# Patient Record
Sex: FEMALE | Race: WHITE | Employment: UNEMPLOYED | URBAN - METROPOLITAN AREA
[De-identification: names, ages, dates, MRNs, and addresses within clinical notes are randomized per-mention and may not be internally consistent; named-entity substitution may affect disease eponyms.]

---

## 2021-08-27 LAB
ABO, EXTERNAL RESULT: NORMAL
C. TRACHOMATIS, EXTERNAL RESULT: NEGATIVE
HEP B, EXTERNAL RESULT: NEGATIVE
HIV, EXTERNAL RESULT: NEGATIVE
N. GONORRHOEAE, EXTERNAL RESULT: NEGATIVE
RH FACTOR, EXTERNAL RESULT: POSITIVE
RPR, EXTERNAL RESULT: NORMAL
RUBELLA TITER, EXTERNAL RESULT: NORMAL

## 2021-09-11 ENCOUNTER — HOSPITAL ENCOUNTER (EMERGENCY)
Age: 21
Discharge: HOME | End: 2021-09-11
Attending: EMERGENCY MEDICINE
Payer: COMMERCIAL

## 2021-09-11 ENCOUNTER — APPOINTMENT (OUTPATIENT)
Dept: ULTRASOUND IMAGING | Age: 21
End: 2021-09-11
Payer: COMMERCIAL

## 2021-09-11 ENCOUNTER — APPOINTMENT (OUTPATIENT)
Dept: GENERAL RADIOLOGY | Age: 21
End: 2021-09-11
Payer: COMMERCIAL

## 2021-09-11 VITALS
WEIGHT: 157.63 LBS | OXYGEN SATURATION: 98 % | SYSTOLIC BLOOD PRESSURE: 100 MMHG | DIASTOLIC BLOOD PRESSURE: 65 MMHG | RESPIRATION RATE: 16 BRPM | HEART RATE: 77 BPM | TEMPERATURE: 98.6 F | BODY MASS INDEX: 29.76 KG/M2 | HEIGHT: 61 IN

## 2021-09-11 DIAGNOSIS — R07.9 ACUTE CHEST PAIN: Primary | ICD-10-CM

## 2021-09-11 DIAGNOSIS — Z34.91 FIRST TRIMESTER PREGNANCY: ICD-10-CM

## 2021-09-11 LAB
ANION GAP SERPL CALCULATED.3IONS-SCNC: 12 MMOL/L (ref 3–16)
BASOPHILS ABSOLUTE: 0.1 K/UL (ref 0–0.2)
BASOPHILS RELATIVE PERCENT: 0.5 %
BUN BLDV-MCNC: 6 MG/DL (ref 7–20)
CALCIUM SERPL-MCNC: 9.1 MG/DL (ref 8.3–10.6)
CHLORIDE BLD-SCNC: 100 MMOL/L (ref 99–110)
CO2: 21 MMOL/L (ref 21–32)
CREAT SERPL-MCNC: <0.5 MG/DL (ref 0.6–1.1)
D DIMER: 223 NG/ML DDU (ref 0–229)
EOSINOPHILS ABSOLUTE: 0 K/UL (ref 0–0.6)
EOSINOPHILS RELATIVE PERCENT: 0.3 %
GFR AFRICAN AMERICAN: >60
GFR NON-AFRICAN AMERICAN: >60
GLUCOSE BLD-MCNC: 121 MG/DL (ref 70–99)
GONADOTROPIN, CHORIONIC (HCG) QUANT: NORMAL MIU/ML
HCG QUALITATIVE: POSITIVE
HCT VFR BLD CALC: 42 % (ref 36–48)
HEMOGLOBIN: 14 G/DL (ref 12–16)
LYMPHOCYTES ABSOLUTE: 1.7 K/UL (ref 1–5.1)
LYMPHOCYTES RELATIVE PERCENT: 15.5 %
MAGNESIUM: 1.8 MG/DL (ref 1.8–2.4)
MCH RBC QN AUTO: 30.4 PG (ref 26–34)
MCHC RBC AUTO-ENTMCNC: 33.3 G/DL (ref 31–36)
MCV RBC AUTO: 91.1 FL (ref 80–100)
MONOCYTES ABSOLUTE: 0.8 K/UL (ref 0–1.3)
MONOCYTES RELATIVE PERCENT: 7.4 %
NEUTROPHILS ABSOLUTE: 8.5 K/UL (ref 1.7–7.7)
NEUTROPHILS RELATIVE PERCENT: 76.3 %
PDW BLD-RTO: 13.1 % (ref 12.4–15.4)
PLATELET # BLD: 316 K/UL (ref 135–450)
PMV BLD AUTO: 7.4 FL (ref 5–10.5)
POTASSIUM REFLEX MAGNESIUM: 3.5 MMOL/L (ref 3.5–5.1)
RBC # BLD: 4.61 M/UL (ref 4–5.2)
SODIUM BLD-SCNC: 133 MMOL/L (ref 136–145)
TROPONIN: <0.01 NG/ML
WBC # BLD: 11.1 K/UL (ref 4–11)

## 2021-09-11 PROCEDURE — 84484 ASSAY OF TROPONIN QUANT: CPT

## 2021-09-11 PROCEDURE — 76801 OB US < 14 WKS SINGLE FETUS: CPT

## 2021-09-11 PROCEDURE — 84702 CHORIONIC GONADOTROPIN TEST: CPT

## 2021-09-11 PROCEDURE — 83735 ASSAY OF MAGNESIUM: CPT

## 2021-09-11 PROCEDURE — 99284 EMERGENCY DEPT VISIT MOD MDM: CPT

## 2021-09-11 PROCEDURE — 84703 CHORIONIC GONADOTROPIN ASSAY: CPT

## 2021-09-11 PROCEDURE — 71046 X-RAY EXAM CHEST 2 VIEWS: CPT

## 2021-09-11 PROCEDURE — 85025 COMPLETE CBC W/AUTO DIFF WBC: CPT

## 2021-09-11 PROCEDURE — 80048 BASIC METABOLIC PNL TOTAL CA: CPT

## 2021-09-11 PROCEDURE — 93005 ELECTROCARDIOGRAM TRACING: CPT

## 2021-09-11 PROCEDURE — 85379 FIBRIN DEGRADATION QUANT: CPT

## 2021-09-11 ASSESSMENT — ENCOUNTER SYMPTOMS
BACK PAIN: 1
TROUBLE SWALLOWING: 0
NAUSEA: 0
ABDOMINAL PAIN: 1
SORE THROAT: 0
VOMITING: 0
SHORTNESS OF BREATH: 1
ABDOMINAL DISTENTION: 0
COUGH: 0
DIARRHEA: 0

## 2021-09-11 NOTE — ED NOTES
D/C: Order noted for d/c. Pt confirmed d/c paperwork has correct name. Discharge and education instructions reviewed with patient. Teach-back successful. Pt verbalized understanding and signed d/c papers. Pt denied questions at this time. No acute distress noted. Patient instructed to follow-up as noted - return to emergency department if symptoms worsen. Patient verbalized understanding. Discharged per EDMD with discharge instructions. Pt discharged to private vehicle. Patient stable upon departure. Thanked patient for choosing [id number]  for care. Provider aware of patient pain at time of discharge.        Karen Hackett RN  09/11/21 8025

## 2021-09-11 NOTE — ED NOTES
Patient to [address] via stretcher.      Guillermo Villegas RN  09/11/21 6334

## 2021-09-11 NOTE — ED TRIAGE NOTES
Patient admitted to ED via private car with complaints of chest pain and pressure for the last week. Patient states that it makes it hard to breathe and that sometimes it feels worse when she is working. States that she is 7 weeks pregnant and wants to be sure that her baby is okay. Denies fevers, nausea, vomiting, diarrhea. Denies medical history. VS are WNL. Patient is alert and oriented x4.

## 2021-09-11 NOTE — ED PROVIDER NOTES
[address] [facility]      Pt Name: Ted Wilkins  MRN: [id number]  [place] 2000  Date ofevaluation: 9/11/2021  Provider: Ayesha Tim MD    CHIEF COMPLAINT       Chief Complaint   Patient presents with    Chest Pain      c/o worsening chest \"heavy\" and states \"hard to breathe\" x 1 week. also various aches and pain in back in abdomen. + 7 weeks preg.  + nausea. denies fever or cough. to ED for eval         HISTORY OF PRESENT ILLNESS   (Location/Symptom, Timing/Onset,Context/Setting, Quality, Duration, Modifying Factors, Severity)  Note limiting factors. Ted Wilkins is a 22 y.o. female  who  has no past medical history on file. 26-year-old female with no past medical history who believes she is approximately 7 weeks pregnant but yet to confirm IUP presents for bilateral chest pain which she describes as achy and dull radiates to her upper back which has been going on for the last week. Occurs intermittently at rest.  Is worse with taking deep breaths. Fluctuating in nature. Has tried moving around in certain positions but that does not help. Nothing specifically relieves it. Never had symptoms like this before. No history of DVT or PE. Patient with no other systemic symptoms at this time besides mild shortness of breath when taking deep breaths but those symptoms are not present at rest completely resolved. The history is provided by the patient. No  was used.    Chest Pain  Pain location:  L lateral chest and R lateral chest  Pain quality: aching, dull and pressure    Pain radiates to:  Upper back (Epigastrium and lower abdomen)  Pain severity:  Mild  Onset quality:  Gradual  Duration:  1 week  Timing:  Intermittent  Progression:  Resolved  Chronicity:  New  Context: breathing    Relieved by:  Nothing  Worsened by:  Deep breathing  Ineffective treatments:  Certain positions  Associated symptoms: abdominal pain, back pain and shortness of breath    Associated symptoms: no altered mental status, no cough, no diaphoresis, no dizziness, no dysphagia, no fatigue, no fever, no headache, no lower extremity edema, no nausea, no numbness, no palpitations, no vomiting and no weakness    Risk factors: pregnancy        NursingNotes were reviewed. REVIEW OF SYSTEMS    (2-9 systems for level 4, 10 or more for level 5)     Review of Systems   Constitutional: Negative. Negative for diaphoresis, fatigue and fever. HENT: Negative. Negative for sore throat and trouble swallowing. Respiratory: Positive for shortness of breath. Negative for cough. Cardiovascular: Positive for chest pain. Negative for palpitations. Gastrointestinal: Positive for abdominal pain. Negative for abdominal distention, diarrhea, nausea and vomiting. Genitourinary: Negative. Negative for dysuria, vaginal bleeding and vaginal discharge. Musculoskeletal: Positive for back pain. Skin: Negative. Neurological: Negative. Negative for dizziness, weakness, light-headedness, numbness and headaches. Hematological: Negative. Does not bruise/bleed easily. Except as noted above the remainder of the review of systems was reviewed and negative. PAST MEDICAL HISTORY   History reviewed. No pertinent past medical history. SURGICALHISTORY     History reviewed. No pertinent surgical history. CURRENT MEDICATIONS       Previous Medications    No medications on file            Patient has no known allergies. FAMILY HISTORY     History reviewed. No pertinent family history.        SOCIAL HISTORY       Social History     Socioeconomic History    Marital status: Single     Spouse name: None    Number of children: None    Years of education: None    Highest education level: None   Occupational History    None   Tobacco Use    Smoking status: Never Smoker    Smokeless tobacco: Never Used   Substance and Sexual Activity  Alcohol use: None    Drug use: None    Sexual activity: None   Other Topics Concern    None   Social History Narrative    None     Social Determinants of Health     Financial Resource Strain:     Difficulty of Paying Living Expenses:    Food Insecurity:     Worried About Running Out of Food in the Last Year:     [address] in the Last Year:    Transportation Needs:     Lack of Transportation (Medical):  Lack of Transportation (Non-Medical):    Physical Activity:     Days of Exercise per Week:     Minutes of Exercise per Session:    Stress:     Feeling of Stress :    Social Connections:     Frequency of Communication with Friends and Family:     Frequency of Social Gatherings with Friends and Family:     Attends [employer] Services:     Active Member of Clubs or Organizations:     Attends Club or Organization Meetings:     Marital Status:    Intimate Partner Violence:     Fear of Current or Ex-Partner:     Emotionally Abused:     Physically Abused:     Sexually Abused:        SCREENINGS             PHYSICAL EXAM    (up to 7 for level 4, 8 or more for level 5)     ED Triage Vitals [09/11/21 1044]   BP Temp Temp Source Pulse Resp SpO2 Height Weight   127/72 98.6 °F (37 °C) Temporal 82 16 99 % 5' 1\" (1.549 m) 157 lb 10.1 oz (71.5 kg)       Physical Exam  Vitals and nursing note reviewed. Constitutional:       General: She is not in acute distress. Appearance: She is not toxic-appearing or diaphoretic. HENT:      Head: Normocephalic and atraumatic. Mouth/Throat:      Mouth: Mucous membranes are moist.   Eyes:      Extraocular Movements: Extraocular movements intact. Pupils: Pupils are equal, round, and reactive to light. Cardiovascular:      Rate and Rhythm: Normal rate and regular rhythm. Heart sounds: Normal heart sounds. Pulmonary:      Effort: Pulmonary effort is normal. No respiratory distress. Breath sounds: Normal breath sounds.  No wheezing, rhonchi or rales. Chest:      Chest wall: No tenderness. Abdominal:      General: Abdomen is flat. Bowel sounds are normal.      Palpations: Abdomen is soft. Tenderness: There is no abdominal tenderness. There is no guarding or rebound. Musculoskeletal:      Right lower leg: No edema. Left lower leg: No edema. Skin:     General: Skin is warm and dry. Capillary Refill: Capillary refill takes less than 2 seconds. Neurological:      General: No focal deficit present. Mental Status: She is alert and oriented to person, place, and time. Psychiatric:         Mood and Affect: Mood normal.         Behavior: Behavior normal.         RESULTS     EKG: All EKG's are interpreted by the Emergency Department Physician who either signs or Co-signsthis chart in the absence of a cardiologist.    EKG Interpretation    Interpreted by emergency department physician    Rhythm: normal sinus   Rate: normal  Axis: normal  Ectopy: none  Conduction: normal  ST Segments: no acute change  T Waves: no acute change  Q Waves: none    Clinical Impression: no acute changes    Barbara Ewing MD      RADIOLOGY:   Non-plain filmimages such as CT, Ultrasound and MRI are read by the radiologist. Plain radiographic images are visualized and preliminarily interpreted by the emergency physician with the below findings:    No acute changes    Interpretation per the Radiologist below, if available at the time ofthis note:    US OB LESS THAN 14 WEEKS SINGLE OR FIRST GESTATION   Final Result   Intrauterine pregnancy with normal fetal heart motion, corresponding to 7   weeks 0 days. XR CHEST (2 VW)   Final Result   No acute process.                ED BEDSIDE ULTRASOUND:   Performed by ED Physician - none    LABS:  Labs Reviewed   CBC WITH AUTO DIFFERENTIAL - Abnormal; Notable for the following components:       Result Value    WBC 11.1 (*)     Neutrophils Absolute 8.5 (*)     All other components within normal limits Narrative:     Performed at:  [facility]  [address] [place] [employer] [address]   Phone [phone]   BASIC METABOLIC PANEL W/ REFLEX TO MG FOR LOW K - Abnormal; Notable for the following components:    Sodium 133 (*)     Glucose 121 (*)     BUN 6 (*)     CREATININE <0.5 (*)     All other components within normal limits    Narrative:     Performed at:  [facility]  [address] [place] [address]   Phone [phone]   TROPONIN    Narrative:     Performed at:  [facility]  [address] [facility] [employer] [address]   Phone [phone]   HCG, SERUM, QUALITATIVE    Narrative:     Performed at:  [facility]  [address] [facility] [address] [facility] [employer] [address]   Phone [phone]   HCG, QUANTITATIVE, PREGNANCY    Narrative:     Performed at:  [facility]  [address] [facility] [address] [facility] [place] [address]   Phone [phone]   D-DIMER, QUANTITATIVE    Narrative:     Performed at:  [facility]  [address], [facility] [employer] [address]   Phone [phone]   MAGNESIUM    Narrative:     Performed at:  [facility]  [address], [address] [facility] [address] [address]   Phone [phone]   URINE RT REFLEX TO CULTURE       All other labs were within normal range or not returned as of this dictation. EMERGENCY DEPARTMENT COURSE and DIFFERENTIAL DIAGNOSIS/MDM:   Vitals:    Vitals:    09/11/21 1216 09/11/21 1230 09/11/21 1246 09/11/21 1300   BP: 122/78 110/62 104/70 103/61   Pulse:       Resp:       Temp:       TempSrc:       SpO2: 98% 97% 98% 97%   Weight:       Height:           Patient was given thefollowing medications:  Medications - No data to display    ED COURSE & MEDICAL DECISION MAKING    Pertinent Labs & Imaging studies reviewed.  (See chart for details)   -  Patient seen and evaluated in the emergency department. -  Triage and nursing notes reviewed and incorporated. -  Old chart records reviewed and incorporated. -  Differential Diagnosis: Acute Coronary Syndrome, Congestive Heart Failure, Thoracic Dissection, Pericarditis, Pericardial Effusion, Pulmonary Embolism, Pneumonia, Pneumothorax, Ischemic Bowel, Bowel Obstruction, PUD, GERD, ectopic pregnancy, missed ab, other    81-year-old female with no chronic past medical history but does believe she is approximately 7 weeks pregnant based on LMP but not confirmed yet with ultrasound who presents for pleuritic chest pain worse with deep breathing which radiates to her back as well as some abdominal cramping and abdominal pain. Pain is 0 at this time. No signs of fluid overload or congestive heart failure earlier. No objective signs of DVT. Pain not consistent with pericarditis or thoracic dissection. No signs of systemic infection or however will order chest x-ray to exclude pneumonia or other toleration. Lung sounds equal bilaterally no concern for pneumothorax. No peritoneal findings on abdominal exam today. No right upper quadrant tenderness. Stated vital signs are completely stable. Afebrile not tachycardic saturating well on room air. No respiratory distress. Will obtain labs and work-up for cardiac as well as PE. Will reevaluate. We will also obtain pelvic ultrasound to rule out ectopic pregnancy with abdominal pain in early pregnancy without confirmed IUP. We will use pregnancy adjusted D-dimer and patient with otherwise normal vital signs using the YEARS criteria. Here is the initial reference for the study and validation of years criteria. Christian Newman LM et al. Pregnancy-Adapted YEARS Algorithm for diagnosis of Suspected Pulmonary Embolism. [facility] 2019; 380(12): [id number].     -  Work-up included:  See above  -  ED treatment included: See above  -  Results discussed with patient.     REASSESSMENT     ED Course as of Sep 11 1414   Sat Sep 11, 2021   1314 D-dimer negative even prior to using pregnancy adjustment. White blood cell count just barely elevated. Awaiting ultrasound at this time. Still remains asymptomatic with normal vital signs saturating well on room air. Blood pressure stable.    [SC]   1350 No further symptoms at this time. OB ultrasound pending.    [SC]   5304 OB ultrasound negative other symptoms. Vital signs stable. Will discharge with OB follow-up which she has scheduled next week. Strict return precautions given for any new or worsening symptoms. [SC]      ED Course User Index  [SC] Isaac Hargrove MD     I estimate there is LOW risk for PULMONARY EMBOLISM, ACUTE CORONARY SYNDROME, OR THORACIC AORTIC DISSECTION, thus I consider the discharge disposition reasonable. The patient is at low risk for mortality based on demographic, history and clinical factors. Given the best available information and clinical assessment, I estimate the risk of hospitalization to be greater than risk of treatment at home. I have explained to the patient that the risk could rapidly change, given precautions for return and instructions. Explained to patient that the risk for mortality is low based on demographic, history and clinical factors. I discussed with patient the results of evaluation in the ED, diagnosis, care, and prognosis. The plan is to discharge to home. Patient is in agreement with plan and questions have been answered. I also discussed with patient the reasons which may require a return visit and the importance of follow-up care. The patient is well-appearing, nontoxic, and improved at the time of discharge. Patient agrees to call to arrange follow-up care as directed. Patient understands to return immediately for worsening/change in symptoms. CRITICAL CARE TIME   Total Critical Care time was 30 minutes, excluding separately reportable procedures.   There was a high probability of clinically significant/life threatening deterioration in the patient's condition which required my urgent intervention. This includes multiple reevaluations, vital sign monitoring, pulse oximetry monitoring, telemetry monitoring, clinical response to the IV medications, reviewing the nursing notes, consultation time, dictation/documentation time, and interpretation of the labwork. (This time excludes time spent performing procedures). CONSULTS:  None    PROCEDURES:  Unless otherwise noted below, none     Procedures    FINAL IMPRESSION      1. Acute chest pain    2.  First trimester pregnancy          DISPOSITION/PLAN   DISPOSITION        PATIENT REFERREDTO:  Private OB physician at outside hospital    Schedule an appointment as soon as possible for a visit         DISCHARGEMEDICATIONS:  New Prescriptions    No medications on file          (Please note that portions of this note were completed with a voice recognition program.  Efforts were made to edit the dictations but occasionally words are mis-transcribed.)    Ben Cochran MD (electronically signed)  Attending Emergency Physician         Ben Cochran MD  09/11/21 4464

## 2021-09-12 LAB
EKG ATRIAL RATE: 72 BPM
EKG DIAGNOSIS: NORMAL
EKG P AXIS: 39 DEGREES
EKG P-R INTERVAL: 128 MS
EKG Q-T INTERVAL: 354 MS
EKG QRS DURATION: 86 MS
EKG QTC CALCULATION (BAZETT): 387 MS
EKG R AXIS: 61 DEGREES
EKG T AXIS: 39 DEGREES
EKG VENTRICULAR RATE: 72 BPM

## 2021-10-06 ENCOUNTER — HOSPITAL ENCOUNTER (OUTPATIENT)
Dept: ULTRASOUND IMAGING | Age: 21
Discharge: HOME | End: 2021-10-06
Payer: COMMERCIAL

## 2021-10-06 DIAGNOSIS — Z34.81 ENCOUNTER FOR SUPERVISION OF OTHER NORMAL PREGNANCY, FIRST TRIMESTER: ICD-10-CM

## 2021-10-06 PROCEDURE — 76801 OB US < 14 WKS SINGLE FETUS: CPT

## 2022-03-03 ENCOUNTER — HOSPITAL ENCOUNTER (OUTPATIENT)
Age: 22
Discharge: HOME | End: 2022-03-03
Attending: OBSTETRICS & GYNECOLOGY | Admitting: OBSTETRICS & GYNECOLOGY
Payer: COMMERCIAL

## 2022-03-03 VITALS
TEMPERATURE: 96.8 F | SYSTOLIC BLOOD PRESSURE: 120 MMHG | DIASTOLIC BLOOD PRESSURE: 64 MMHG | BODY MASS INDEX: 35.87 KG/M2 | HEART RATE: 110 BPM | WEIGHT: 190 LBS | HEIGHT: 61 IN | RESPIRATION RATE: 18 BRPM

## 2022-03-03 LAB
ABO/RH: NORMAL
ANTIBODY SCREEN: NORMAL
BACTERIA: ABNORMAL /HPF
BILIRUBIN URINE: NEGATIVE
BLOOD, URINE: NEGATIVE
CLARITY: ABNORMAL
COLOR: ABNORMAL
EPITHELIAL CELLS, UA: 20 /HPF (ref 0–5)
GLUCOSE URINE: NEGATIVE MG/DL
HYALINE CASTS: 2 /LPF (ref 0–8)
KETONES, URINE: NEGATIVE MG/DL
LEUKOCYTE ESTERASE, URINE: ABNORMAL
MICROSCOPIC EXAMINATION: YES
NITRITE, URINE: NEGATIVE
PH UA: 7 (ref 5–8)
PROTEIN UA: NEGATIVE MG/DL
RBC UA: 3 /HPF (ref 0–4)
SPECIFIC GRAVITY UA: 1.01 (ref 1–1.03)
URINE REFLEX TO CULTURE: YES
URINE TYPE: ABNORMAL
UROBILINOGEN, URINE: 1 E.U./DL
WBC UA: 23 /HPF (ref 0–5)

## 2022-03-03 PROCEDURE — 99205 OFFICE O/P NEW HI 60 MIN: CPT

## 2022-03-03 PROCEDURE — 2580000003 HC RX 258: Performed by: OBSTETRICS & GYNECOLOGY

## 2022-03-03 PROCEDURE — 81001 URINALYSIS AUTO W/SCOPE: CPT

## 2022-03-03 PROCEDURE — 86900 BLOOD TYPING SEROLOGIC ABO: CPT

## 2022-03-03 PROCEDURE — 86901 BLOOD TYPING SEROLOGIC RH(D): CPT

## 2022-03-03 PROCEDURE — 86850 RBC ANTIBODY SCREEN: CPT

## 2022-03-03 PROCEDURE — 59025 FETAL NON-STRESS TEST: CPT

## 2022-03-03 PROCEDURE — 87086 URINE CULTURE/COLONY COUNT: CPT

## 2022-03-03 RX ORDER — SODIUM CHLORIDE, SODIUM LACTATE, POTASSIUM CHLORIDE, AND CALCIUM CHLORIDE .6; .31; .03; .02 G/100ML; G/100ML; G/100ML; G/100ML
1000 INJECTION, SOLUTION INTRAVENOUS ONCE
Status: COMPLETED | OUTPATIENT
Start: 2022-03-03 | End: 2022-03-03

## 2022-03-03 RX ADMIN — SODIUM CHLORIDE, POTASSIUM CHLORIDE, SODIUM LACTATE AND CALCIUM CHLORIDE 1000 ML: 600; 310; 30; 20 INJECTION, SOLUTION INTRAVENOUS at 10:24

## 2022-03-03 NOTE — FLOWSHEET NOTE
Dr. Dontae Neves at bedside has assessed patient, reviewed FHT, and performed SVE - closed/thick/high. Verbal order received to discharge patient home. FHT stopped at this time for discharge. After completion of the Medical Screening Evaluation, it has been determined that a medical emergency does not exist and the patient is not in active labor, and therefore the patient may be discharged home.

## 2022-03-03 NOTE — PROGRESS NOTES
23 yo G1 @ 32 weeks, presents to triage with  contractions, that have improved after IVF. Has no LOF, VB, baby is moving well. NST-reactive  SVE: closed/thick/high, cephalic. UA-P  P: d/c to home with precautions, follow routine appt in the office in 10 days.    Faustino Bains MD

## 2022-03-03 NOTE — FLOWSHEET NOTE
Discharge instructions given and reviewed. All questions answered. IV d/c'd for discharge. U/a results still pending, Dr. Brianna Kathleen okay with patients discharge, she will call patient later if its abnormal.  Patient refused wheelchair, ambulated out in stable condition.

## 2022-03-03 NOTE — FLOWSHEET NOTE
Patient admitted to triage with complaints of left and right sided abd pain that she rates a 6/10 and mild vaginal pain. She reports no vaginal bleeding or vaginal fluid leaking. She is feeling the baby move. She states that she was cramping last night but that has stopped and now it is more sharp and on both sides of her abd at the same time. She reported that it is constant, but then as I was completing admission questions, she said \"I feel that pain right now\" and at the same time I noted a contraction on the monitor. FHT shows baseline FHR of 130 with mod variability and several accelerations. Contraction monitor shows irritability. Patient is feeling the irritability but I cannot palpate contractions. The Covid vaccine team arrived in the room and requested an order from the [facility] for the vaccine. Call made to Dr. Elena Zamora in [address], I requested an order for vaccine as well as IV placement and LR bolus to hydrate related to uterine irritability. Orders received. Will also send u/a reflex to culture to rule out UTI. Patient aware and agreeable to all.    20g IV placed in Left hand, one successful attempt, brisk blood return. IVF bolus started.

## 2022-03-03 NOTE — FLOWSHEET NOTE
03/03/22 1036   Fetal Heart Rate   Mode External US   Baseline Rate 130 bpm   Baseline Classification Normal   Variability 6-25 BPM   Pattern Accelerations   Patient Feels Fetal Movement Yes   Interventions RN at Bedside   OB Bladder Status Non-distended;Voiding   OB Bladder Intervention Voiding with Relief   Fetal Monitoring Strip   FMS Reviewed? Yes   FMS Reviewed By? NPyajaira, RN   Uterine Activity   UA Mode Palpation; [place]   Contraction Frequency irritability   Contraction Intensity Mild   Resting Tone Palpated Soft     NST result = Reactive per Dr. Hilary Ormond

## 2022-03-05 LAB — URINE CULTURE, ROUTINE: NORMAL

## 2022-04-07 LAB — GBS, EXTERNAL RESULT: POSITIVE

## 2022-04-28 ENCOUNTER — HOSPITAL ENCOUNTER (INPATIENT)
Age: 22
LOS: 2 days | Discharge: HOME | End: 2022-04-30
Attending: OBSTETRICS & GYNECOLOGY | Admitting: OBSTETRICS & GYNECOLOGY
Payer: COMMERCIAL

## 2022-04-28 ENCOUNTER — ANESTHESIA EVENT (OUTPATIENT)
Dept: LABOR AND DELIVERY | Age: 22
End: 2022-04-28
Payer: COMMERCIAL

## 2022-04-28 ENCOUNTER — ANESTHESIA (OUTPATIENT)
Dept: LABOR AND DELIVERY | Age: 22
End: 2022-04-28
Payer: COMMERCIAL

## 2022-04-28 PROBLEM — O47.9 IRREGULAR UTERINE CONTRACTIONS: Status: ACTIVE | Noted: 2022-04-28

## 2022-04-28 LAB
ABO/RH: NORMAL
AMPHETAMINE SCREEN, URINE: NORMAL
ANTIBODY SCREEN: NORMAL
BARBITURATE SCREEN URINE: NORMAL
BASOPHILS ABSOLUTE: 0.1 K/UL (ref 0–0.2)
BASOPHILS RELATIVE PERCENT: 0.6 %
BENZODIAZEPINE SCREEN, URINE: NORMAL
BUPRENORPHINE URINE: NORMAL
CANNABINOID SCREEN URINE: NORMAL
COCAINE METABOLITE SCREEN URINE: NORMAL
EOSINOPHILS ABSOLUTE: 0 K/UL (ref 0–0.6)
EOSINOPHILS RELATIVE PERCENT: 0 %
HCT VFR BLD CALC: 35.2 % (ref 36–48)
HEMOGLOBIN: 11.4 G/DL (ref 12–16)
LYMPHOCYTES ABSOLUTE: 1 K/UL (ref 1–5.1)
LYMPHOCYTES RELATIVE PERCENT: 7.5 %
Lab: NORMAL
MCH RBC QN AUTO: 28.3 PG (ref 26–34)
MCHC RBC AUTO-ENTMCNC: 32.4 G/DL (ref 31–36)
MCV RBC AUTO: 87.3 FL (ref 80–100)
METHADONE SCREEN, URINE: NORMAL
MONOCYTES ABSOLUTE: 0.6 K/UL (ref 0–1.3)
MONOCYTES RELATIVE PERCENT: 4.4 %
NEUTROPHILS ABSOLUTE: 11.8 K/UL (ref 1.7–7.7)
NEUTROPHILS RELATIVE PERCENT: 87.5 %
OPIATE SCREEN URINE: NORMAL
OXYCODONE URINE: NORMAL
PDW BLD-RTO: 13.6 % (ref 12.4–15.4)
PH UA: 6.5
PHENCYCLIDINE SCREEN URINE: NORMAL
PLATELET # BLD: 259 K/UL (ref 135–450)
PMV BLD AUTO: 7.6 FL (ref 5–10.5)
PROPOXYPHENE SCREEN: NORMAL
RBC # BLD: 4.03 M/UL (ref 4–5.2)
TOTAL SYPHILLIS IGG/IGM: NORMAL
WBC # BLD: 13.5 K/UL (ref 4–11)

## 2022-04-28 PROCEDURE — 10907ZC DRAINAGE OF AMNIOTIC FLUID, THERAPEUTIC FROM PRODUCTS OF CONCEPTION, VIA NATURAL OR ARTIFICIAL OPENING: ICD-10-PCS | Performed by: OBSTETRICS & GYNECOLOGY

## 2022-04-28 PROCEDURE — 85025 COMPLETE CBC W/AUTO DIFF WBC: CPT

## 2022-04-28 PROCEDURE — 59025 FETAL NON-STRESS TEST: CPT

## 2022-04-28 PROCEDURE — 0HQ9XZZ REPAIR PERINEUM SKIN, EXTERNAL APPROACH: ICD-10-PCS | Performed by: OBSTETRICS & GYNECOLOGY

## 2022-04-28 PROCEDURE — 6370000000 HC RX 637 (ALT 250 FOR IP): Performed by: OBSTETRICS & GYNECOLOGY

## 2022-04-28 PROCEDURE — 2500000003 HC RX 250 WO HCPCS: Performed by: NURSE ANESTHETIST, CERTIFIED REGISTERED

## 2022-04-28 PROCEDURE — 86901 BLOOD TYPING SEROLOGIC RH(D): CPT

## 2022-04-28 PROCEDURE — 1220000000 HC SEMI PRIVATE OB R&B

## 2022-04-28 PROCEDURE — 2500000003 HC RX 250 WO HCPCS

## 2022-04-28 PROCEDURE — 6360000002 HC RX W HCPCS: Performed by: OBSTETRICS & GYNECOLOGY

## 2022-04-28 PROCEDURE — 86780 TREPONEMA PALLIDUM: CPT

## 2022-04-28 PROCEDURE — 3700000025 EPIDURAL BLOCK: Performed by: ANESTHESIOLOGY

## 2022-04-28 PROCEDURE — 86900 BLOOD TYPING SEROLOGIC ABO: CPT

## 2022-04-28 PROCEDURE — 80307 DRUG TEST PRSMV CHEM ANLYZR: CPT

## 2022-04-28 PROCEDURE — 86850 RBC ANTIBODY SCREEN: CPT

## 2022-04-28 PROCEDURE — 2580000003 HC RX 258: Performed by: OBSTETRICS & GYNECOLOGY

## 2022-04-28 PROCEDURE — 51701 INSERT BLADDER CATHETER: CPT

## 2022-04-28 PROCEDURE — 7200000001 HC VAGINAL DELIVERY

## 2022-04-28 PROCEDURE — 3E033VJ INTRODUCTION OF OTHER HORMONE INTO PERIPHERAL VEIN, PERCUTANEOUS APPROACH: ICD-10-PCS | Performed by: OBSTETRICS & GYNECOLOGY

## 2022-04-28 RX ORDER — LIDOCAINE HYDROCHLORIDE AND EPINEPHRINE 15; 5 MG/ML; UG/ML
INJECTION, SOLUTION EPIDURAL PRN
Status: DISCONTINUED | OUTPATIENT
Start: 2022-04-28 | End: 2022-04-28 | Stop reason: SDUPTHER

## 2022-04-28 RX ORDER — SODIUM CHLORIDE 0.9 % (FLUSH) 0.9 %
5-40 SYRINGE (ML) INJECTION PRN
Status: DISCONTINUED | OUTPATIENT
Start: 2022-04-28 | End: 2022-04-30 | Stop reason: HOSPADM

## 2022-04-28 RX ORDER — SODIUM CHLORIDE, SODIUM LACTATE, POTASSIUM CHLORIDE, AND CALCIUM CHLORIDE .6; .31; .03; .02 G/100ML; G/100ML; G/100ML; G/100ML
500 INJECTION, SOLUTION INTRAVENOUS PRN
Status: DISCONTINUED | OUTPATIENT
Start: 2022-04-28 | End: 2022-04-28

## 2022-04-28 RX ORDER — NALOXONE HYDROCHLORIDE 0.4 MG/ML
INJECTION, SOLUTION INTRAMUSCULAR; INTRAVENOUS; SUBCUTANEOUS PRN
Status: DISCONTINUED | OUTPATIENT
Start: 2022-04-28 | End: 2022-04-28

## 2022-04-28 RX ORDER — SODIUM CHLORIDE 0.9 % (FLUSH) 0.9 %
5-40 SYRINGE (ML) INJECTION PRN
Status: DISCONTINUED | OUTPATIENT
Start: 2022-04-28 | End: 2022-04-28

## 2022-04-28 RX ORDER — DOCUSATE SODIUM 100 MG/1
100 CAPSULE, LIQUID FILLED ORAL 2 TIMES DAILY
Status: DISCONTINUED | OUTPATIENT
Start: 2022-04-28 | End: 2022-04-30 | Stop reason: HOSPADM

## 2022-04-28 RX ORDER — BUPIVACAINE HYDROCHLORIDE 2.5 MG/ML
INJECTION, SOLUTION EPIDURAL; INFILTRATION; INTRACAUDAL PRN
Status: DISCONTINUED | OUTPATIENT
Start: 2022-04-28 | End: 2022-04-28 | Stop reason: SDUPTHER

## 2022-04-28 RX ORDER — SODIUM CHLORIDE 9 MG/ML
INJECTION, SOLUTION INTRAVENOUS PRN
Status: DISCONTINUED | OUTPATIENT
Start: 2022-04-28 | End: 2022-04-30 | Stop reason: HOSPADM

## 2022-04-28 RX ORDER — SODIUM CHLORIDE, SODIUM LACTATE, POTASSIUM CHLORIDE, CALCIUM CHLORIDE 600; 310; 30; 20 MG/100ML; MG/100ML; MG/100ML; MG/100ML
INJECTION, SOLUTION INTRAVENOUS CONTINUOUS
Status: DISCONTINUED | OUTPATIENT
Start: 2022-04-28 | End: 2022-04-28

## 2022-04-28 RX ORDER — HYDROCODONE BITARTRATE AND ACETAMINOPHEN 5; 325 MG/1; MG/1
1 TABLET ORAL EVERY 4 HOURS PRN
Status: DISCONTINUED | OUTPATIENT
Start: 2022-04-28 | End: 2022-04-30 | Stop reason: HOSPADM

## 2022-04-28 RX ORDER — ONDANSETRON 2 MG/ML
4 INJECTION INTRAMUSCULAR; INTRAVENOUS EVERY 6 HOURS PRN
Status: DISCONTINUED | OUTPATIENT
Start: 2022-04-28 | End: 2022-04-28

## 2022-04-28 RX ORDER — SODIUM CHLORIDE 0.9 % (FLUSH) 0.9 %
5-40 SYRINGE (ML) INJECTION EVERY 12 HOURS SCHEDULED
Status: DISCONTINUED | OUTPATIENT
Start: 2022-04-28 | End: 2022-04-30 | Stop reason: HOSPADM

## 2022-04-28 RX ORDER — IBUPROFEN 800 MG/1
800 TABLET ORAL EVERY 8 HOURS PRN
Status: DISCONTINUED | OUTPATIENT
Start: 2022-04-28 | End: 2022-04-30 | Stop reason: HOSPADM

## 2022-04-28 RX ORDER — LANOLIN 100 %
OINTMENT (GRAM) TOPICAL PRN
Status: DISCONTINUED | OUTPATIENT
Start: 2022-04-28 | End: 2022-04-30 | Stop reason: HOSPADM

## 2022-04-28 RX ORDER — HYDROCODONE BITARTRATE AND ACETAMINOPHEN 5; 325 MG/1; MG/1
2 TABLET ORAL EVERY 4 HOURS PRN
Status: DISCONTINUED | OUTPATIENT
Start: 2022-04-28 | End: 2022-04-30 | Stop reason: HOSPADM

## 2022-04-28 RX ORDER — LIDOCAINE HYDROCHLORIDE 20 MG/ML
INJECTION, SOLUTION EPIDURAL; INFILTRATION; INTRACAUDAL; PERINEURAL PRN
Status: DISCONTINUED | OUTPATIENT
Start: 2022-04-28 | End: 2022-04-28 | Stop reason: SDUPTHER

## 2022-04-28 RX ORDER — TERBUTALINE SULFATE 1 MG/ML
0.25 INJECTION, SOLUTION SUBCUTANEOUS ONCE
Status: DISCONTINUED | OUTPATIENT
Start: 2022-04-28 | End: 2022-04-28

## 2022-04-28 RX ORDER — ACETAMINOPHEN 325 MG/1
650 TABLET ORAL EVERY 4 HOURS PRN
Status: DISCONTINUED | OUTPATIENT
Start: 2022-04-28 | End: 2022-04-30 | Stop reason: HOSPADM

## 2022-04-28 RX ORDER — SODIUM CHLORIDE 9 MG/ML
25 INJECTION, SOLUTION INTRAVENOUS PRN
Status: DISCONTINUED | OUTPATIENT
Start: 2022-04-28 | End: 2022-04-28

## 2022-04-28 RX ORDER — SODIUM CHLORIDE, SODIUM LACTATE, POTASSIUM CHLORIDE, AND CALCIUM CHLORIDE .6; .31; .03; .02 G/100ML; G/100ML; G/100ML; G/100ML
1000 INJECTION, SOLUTION INTRAVENOUS PRN
Status: DISCONTINUED | OUTPATIENT
Start: 2022-04-28 | End: 2022-04-28

## 2022-04-28 RX ORDER — SODIUM CHLORIDE 0.9 % (FLUSH) 0.9 %
5-40 SYRINGE (ML) INJECTION EVERY 12 HOURS SCHEDULED
Status: DISCONTINUED | OUTPATIENT
Start: 2022-04-28 | End: 2022-04-28

## 2022-04-28 RX ADMIN — LIDOCAINE HYDROCHLORIDE 5 ML: 20 INJECTION, SOLUTION EPIDURAL; INFILTRATION; INTRACAUDAL; PERINEURAL at 13:11

## 2022-04-28 RX ADMIN — SODIUM CHLORIDE, POTASSIUM CHLORIDE, SODIUM LACTATE AND CALCIUM CHLORIDE 1000 ML: 600; 310; 30; 20 INJECTION, SOLUTION INTRAVENOUS at 07:54

## 2022-04-28 RX ADMIN — DEXTROSE MONOHYDRATE 5 MILLION UNITS: 5 INJECTION INTRAVENOUS at 07:54

## 2022-04-28 RX ADMIN — Medication 2.5 MILLION UNITS: at 12:21

## 2022-04-28 RX ADMIN — IBUPROFEN 800 MG: 800 TABLET, FILM COATED ORAL at 17:07

## 2022-04-28 RX ADMIN — LIDOCAINE HYDROCHLORIDE AND EPINEPHRINE 3 ML: 15; 5 INJECTION, SOLUTION EPIDURAL at 09:17

## 2022-04-28 RX ADMIN — SODIUM CHLORIDE, POTASSIUM CHLORIDE, SODIUM LACTATE AND CALCIUM CHLORIDE 1000 ML: 600; 310; 30; 20 INJECTION, SOLUTION INTRAVENOUS at 08:14

## 2022-04-28 RX ADMIN — Medication 10 UNITS: at 14:04

## 2022-04-28 RX ADMIN — Medication 87.3 MILLI-UNITS/MIN: at 14:17

## 2022-04-28 RX ADMIN — Medication 1 MILLI-UNITS/MIN: at 08:10

## 2022-04-28 RX ADMIN — BUPIVACAINE HYDROCHLORIDE 5 ML: 2.5 INJECTION, SOLUTION EPIDURAL; INFILTRATION; INTRACAUDAL at 12:46

## 2022-04-28 SDOH — HEALTH STABILITY: MENTAL HEALTH
STRESS IS WHEN SOMEONE FEELS TENSE, NERVOUS, ANXIOUS, OR CAN'T SLEEP AT NIGHT BECAUSE THEIR MIND IS TROUBLED. HOW STRESSED ARE YOU?: NOT AT ALL

## 2022-04-28 SDOH — ECONOMIC STABILITY: INCOME INSECURITY: IN THE LAST 12 MONTHS, WAS THERE A TIME WHEN YOU WERE NOT ABLE TO PAY THE MORTGAGE OR RENT ON TIME?: NO

## 2022-04-28 SDOH — ECONOMIC STABILITY: TRANSPORTATION INSECURITY
IN THE PAST 12 MONTHS, HAS THE LACK OF TRANSPORTATION KEPT YOU FROM MEDICAL APPOINTMENTS OR FROM GETTING MEDICATIONS?: NO

## 2022-04-28 SDOH — ECONOMIC STABILITY: HOUSING INSECURITY
IN THE LAST 12 MONTHS, WAS THERE A TIME WHEN YOU DID NOT HAVE A STEADY PLACE TO SLEEP OR SLEPT IN A SHELTER (INCLUDING NOW)?: NO

## 2022-04-28 SDOH — SOCIAL STABILITY: SOCIAL NETWORK: ARE YOU MARRIED, WIDOWED, DIVORCED, SEPARATED, NEVER MARRIED, OR LIVING WITH A PARTNER?: LIVING WITH PARTNER

## 2022-04-28 SDOH — ECONOMIC STABILITY: INCOME INSECURITY: HOW HARD IS IT FOR YOU TO PAY FOR THE VERY BASICS LIKE FOOD, HOUSING, MEDICAL CARE, AND HEATING?: NOT HARD AT ALL

## 2022-04-28 SDOH — ECONOMIC STABILITY: TRANSPORTATION INSECURITY
IN THE PAST 12 MONTHS, HAS LACK OF TRANSPORTATION KEPT YOU FROM MEETINGS, WORK, OR FROM GETTING THINGS NEEDED FOR DAILY LIVING?: NO

## 2022-04-28 SDOH — HEALTH STABILITY: PHYSICAL HEALTH: ON AVERAGE, HOW MANY DAYS PER WEEK DO YOU ENGAGE IN MODERATE TO STRENUOUS EXERCISE (LIKE A BRISK WALK)?: 2 DAYS

## 2022-04-28 SDOH — HEALTH STABILITY: PHYSICAL HEALTH: ON AVERAGE, HOW MANY MINUTES DO YOU ENGAGE IN EXERCISE AT THIS LEVEL?: 50 MIN

## 2022-04-28 SDOH — SOCIAL STABILITY: SOCIAL NETWORK: HOW OFTEN DO YOU ATTENT MEETINGS OF THE CLUB OR ORGANIZATION YOU BELONG TO?: NEVER

## 2022-04-28 SDOH — HEALTH STABILITY: MENTAL HEALTH: HOW OFTEN DO YOU HAVE A DRINK CONTAINING ALCOHOL?: NEVER

## 2022-04-28 SDOH — ECONOMIC STABILITY: FOOD INSECURITY: WITHIN THE PAST 12 MONTHS, THE FOOD YOU BOUGHT JUST DIDN'T LAST AND YOU DIDN'T HAVE MONEY TO GET MORE.: NEVER TRUE

## 2022-04-28 SDOH — SOCIAL STABILITY: SOCIAL NETWORK: IN A TYPICAL WEEK, HOW MANY TIMES DO YOU TALK ON THE PHONE WITH FAMILY, FRIENDS, OR NEIGHBORS?: THREE TIMES A WEEK

## 2022-04-28 SDOH — SOCIAL STABILITY: SOCIAL NETWORK
DO YOU BELONG TO ANY CLUBS OR ORGANIZATIONS SUCH AS CHURCH GROUPS UNIONS, FRATERNAL OR ATHLETIC GROUPS, OR SCHOOL GROUPS?: NO

## 2022-04-28 SDOH — SOCIAL STABILITY: SOCIAL INSECURITY: WITHIN THE LAST YEAR, HAVE YOU BEEN AFRAID OF YOUR PARTNER OR EX-PARTNER?: NO

## 2022-04-28 SDOH — ECONOMIC STABILITY: FOOD INSECURITY: WITHIN THE PAST 12 MONTHS, YOU WORRIED THAT YOUR FOOD WOULD RUN OUT BEFORE YOU GOT MONEY TO BUY MORE.: NEVER TRUE

## 2022-04-28 SDOH — ECONOMIC STABILITY: INCOME INSECURITY: IN THE LAST 12 MONTHS, WAS THERE A TIME WHEN YOU WERE NOT ABLE TO PAY THE MORTGAGE OR RENT ON TIME?: YES

## 2022-04-28 SDOH — SOCIAL STABILITY: SOCIAL NETWORK: HOW OFTEN DO YOU ATTEND CHURCH OR RELIGIOUS SERVICES?: NEVER

## 2022-04-28 SDOH — SOCIAL STABILITY: SOCIAL INSECURITY: WITHIN THE LAST YEAR, HAVE YOU BEEN HUMILIATED OR EMOTIONALLY ABUSED IN OTHER WAYS BY YOUR PARTNER OR EX-PARTNER?: NO

## 2022-04-28 SDOH — SOCIAL STABILITY: SOCIAL INSECURITY
WITHIN THE LAST YEAR, HAVE TO BEEN RAPED OR FORCED TO HAVE ANY KIND OF SEXUAL ACTIVITY BY YOUR PARTNER OR EX-PARTNER?: NO

## 2022-04-28 SDOH — SOCIAL STABILITY: SOCIAL INSECURITY
WITHIN THE LAST YEAR, HAVE YOU BEEN KICKED, HIT, SLAPPED, OR OTHERWISE PHYSICALLY HURT BY YOUR PARTNER OR EX-PARTNER?: NO

## 2022-04-28 SDOH — SOCIAL STABILITY: SOCIAL NETWORK: HOW OFTEN DO YOU GET TOGETHER WITH FRIENDS OR RELATIVES?: THREE TIMES A WEEK

## 2022-04-28 ASSESSMENT — PAIN SCALES - GENERAL
PAINLEVEL_OUTOF10: 0
PAINLEVEL_OUTOF10: 2
PAINLEVEL_OUTOF10: 0

## 2022-04-28 ASSESSMENT — PAIN DESCRIPTION - DESCRIPTORS: DESCRIPTORS: CRAMPING

## 2022-04-28 ASSESSMENT — PAIN - FUNCTIONAL ASSESSMENT
PAIN_FUNCTIONAL_ASSESSMENT: ACTIVITIES ARE NOT PREVENTED
PAIN_FUNCTIONAL_ASSESSMENT: ACTIVITIES ARE NOT PREVENTED

## 2022-04-28 ASSESSMENT — PAIN DESCRIPTION - LOCATION: LOCATION: ABDOMEN

## 2022-04-28 ASSESSMENT — PAIN DESCRIPTION - ORIENTATION: ORIENTATION: LOWER

## 2022-04-28 ASSESSMENT — ENCOUNTER SYMPTOMS: SHORTNESS OF BREATH: 0

## 2022-04-28 NOTE — FLOWSHEET NOTE
viable female  APGARS 7/9. Infant dried and stimulated. Infant dusky at delivery even after crying. Brought infant to warmer for evaluation. Hat and diaper on.

## 2022-04-28 NOTE — FLOWSHEET NOTE
Introduced to patient and significant other. Updated whiteboard and plan of care. Encouraged to call with questions/concerns. Patient with infant in SCN.

## 2022-04-28 NOTE — FLOWSHEET NOTE
Dr Faisal Real called and informed of arrival of 23 yo  [facility]  EGA 39.5 presenting with c/o of contractions. Pt with contractions q 3-7 x90-180,  +LTV&accels noted, SVE 1-2/60/-2. Orders received.

## 2022-04-28 NOTE — FLOWSHEET NOTE
Pt back to bed per her request.  Pt placed on EFM and toco with monitoring at central bank with consent.

## 2022-04-28 NOTE — ANESTHESIA PROCEDURE NOTES
Epidural Block    Patient location during procedure: OB  Start time: 4/28/2022 9:12 AM  End time: 4/28/2022 9:31 AM  Reason for block: labor epidural  Staffing  Performed: resident/CRNA   Anesthesiologist: Nancy Ramos MD  Resident/CRNA: BILL Christensen - CRNA  Preanesthetic Checklist  Completed: patient identified, IV checked, site marked, risks and benefits discussed, surgical consent, monitors and equipment checked, pre-op evaluation, timeout performed, anesthesia consent given, oxygen available and patient being monitored  Epidural  Patient position: sitting  Prep: ChloraPrep and site prepped and draped  Patient monitoring: continuous pulse ox  Approach: midline  Location: L3-4  Injection technique: GABRIELLE saline  Provider prep: mask and sterile gloves  Needle  Needle type: Tuohy   Needle gauge: 17 G  Needle length: 3.5 in  Needle insertion depth: 8 cm  Catheter type: multi-orifice  Catheter size: 19 G  Catheter at skin depth: 13 cm  Test dose: negativeCatheter Secured: tegaderm and tape  Assessment  Sensory level: T10  Hemodynamics: stable  Attempts: 1Outcomes: uncomplicated (pt tolerated procedure well)  Additional Notes  Consent:  Risks and benefits of the labor epidural were discussed and the patient was given the opportunity to ask questions. Informed consent was obtained. Timeout:  A \"time out\" was performed immediately prior to the start of the epidural procedure.  The patient, site, procedure and provider were correctly identified.  Allergies were reviewed.  All members of the team and the patient participated in the time out. Procedure  Skin wheal with Lidocaine 1% 2 mL @ L3-L4. Loss of resistance with 3 mL of normal saline to expand the epidural space. denies paresthesia. Intentional dural puncture (DPE technique) with 25 G pencan spinal needle. CSF  positive, Blood: negative. Spinal needle removed and epidural catheter threaded without difficulty. Test dose negative with (3ml 1. 5%Lidocaine with 1:200,000 Epinephrine)  Occlusive dressing; steri strips, tegaderm and tape applied using aseptic technique. Attempts: 1   Difficulties/Complications: None    The patient was returned to the supine position with her head of bed elevated 30 degrees and left uterine displacement. She tolerated the epidural placement and dosing well. RN remains at bedside to monitor patient.       BILL Martinez CRNA  9:35 AM

## 2022-04-28 NOTE — FLOWSHEET NOTE
04/28/22 0223   Fetal Heart Rate   Mode External US   Baseline Rate 130 bpm   Baseline Classification Normal   Variability 6-25 BPM   Pattern Accelerations   Patient Feels Fetal Movement Yes   Interventions RN at Bedside   OB Bladder Status Non-distended;Voiding   OB Bladder Intervention Voiding with Relief   Multiple birth? No   Fetal Monitoring Strip   FMS Reviewed? Yes   FMS Reviewed By? td   Uterine Activity   UA Mode Palpation; [place]   Contraction Frequency 3-7   Contraction Duration    Contraction Intensity Moderate;Mild   Resting Tone Palpated Soft

## 2022-04-28 NOTE — L&D DELIVERY NOTE
Department of Obstetrics and Gynecology  Labor Augmentation Vaginal Delivery Note    Marge CAIN,[id number]    PRENATAL CARE: Complicated by: none    Pre-operative Diagnosis:  IUP @ 39 5/7 wks; early labor; augmentation with amniotomy and pitocin per protocol      Post-operative Diagnosis: the same    Type of induction: pitocin and amniotomy    Additional Procedures: uterine exploration; periurethral laceration (sterile catheter introduced into bladder without difficulty and 50 mls clear urine drained after repair); 1st degree perineal laceration repair     Information for the patient's :  Rukhsana Hernandez [[id number]]                    Infant Wt: [id number]  Information for the patient's :  Rukhsana Hernandez [[id number]]            APGARS:   Per RN record  Information for the patient's :  Rukhsana Hernandez [[id number]]           Anesthesia:  epidural anesthesia    Specimen:  Placenta sent to pathology No    Estimated blood loss: 200mls     Condition: stable    Delivery Summary: Patient is Aleksandra Villalobos  is a 22 y.o.  female at 39w5d admitted to Labor and Delivery room for induction of labor and placed on external monitors. After FHT were confirmed to be reassuring the induction proceeded with pitocin by protocol. Contractions were regular, FHT reactive with moderate variability without decels. Pt did received epidural anesthesia. Progress of labor as anticipated and now fully dilated cervix. With subsequent contractions she started pushing and delivered vaginally spontaneously with no complications. A tight nuchal cord x 1 was noted at delivery and delivered through. 10 Units of Pitocin in 500 ml of LR was started IV. Placenta, cord and membranes were delivered spontaneously within less than 15 minutes. Uterus was explored. Vaginal walls, cervix, perineum, rectum were intact on inspection.  The cervix, vagina and perineum were examined and periurethral and 1st degree perineal laceration were repaired in the regular fashion with Vicryl. Uterus was well contracted. Vaginal sweep was done, laps count was correct. Mother and  left stable to recovery.      Electronically signed by Jose E Gustafson MD on 2022 at 2:26 PM

## 2022-04-28 NOTE — FLOWSHEET NOTE
Patient ambulatory to exam A for evaluation of contractions. Patient given gown to change into and oriented to room. Plan of care reviewed. Patient verbalized understanding. EFM applied with consent to central monitor bank with alarms on. Uterus soft and non-tender. Fetal movement is positive.

## 2022-04-28 NOTE — FLOWSHEET NOTE
First time get up to BR w/ assist x 2. Pt voided adequate amount of eufemia urine without difficulty. Pt performed maribell care per self after instruction. New ice pack, peripad, gown, and underwear provided. Pt transferred to room 2256  via wheelchair. Accompanied by personal belongings. Pt request to go to nursery to see infant. Pt taken to nursery via w/c.

## 2022-04-28 NOTE — FLOWSHEET NOTE
Dr Radha Sarabia gave orders to admit patient, standard labor orders with pitocin, patient is GBS positive,  PCN order set to be placed. Patient to be transferred to room 2287.

## 2022-04-28 NOTE — H&P
Department of Obstetrics and Gynecology   Obstetrics History and Physical        CHIEF COMPLAINT:  contractions    HISTORY OF PRESENT ILLNESS:    Mary Brizuela  is a 22 y.o.  female at 38w11d presents with a chief complaint as above and is being admitted for early labor    Estimated Due Date: Estimated Date of Delivery: 22    PRENATAL CARE: Complicated by: none    PAST OB HISTORY:  OB History        1    Para        Term                AB        Living           SAB        IAB        Ectopic        Molar        Multiple   1    Live Births                  Past Medical History:    History reviewed. No pertinent past medical history. Past Surgical History:        Procedure Laterality Date    EYE SURGERY       Allergies:  Patient has no known allergies. Social History:    Social History     Socioeconomic History    Marital status: Single     Spouse name: Not on file    Number of children: Not on file    Years of education: Not on file    Highest education level: Not on file   Occupational History    Not on file   Tobacco Use    Smoking status: Never Smoker    Smokeless tobacco: Never Used   Vaping Use    Vaping Use: Former   Substance and Sexual Activity    Alcohol use: Never    Drug use: Never    Sexual activity: Yes     Partners: Male   Other Topics Concern    Not on file   Social History Narrative    Not on file     Social Determinants of Health     Financial Resource Strain: Low Risk     Difficulty of Paying Living Expenses: Not hard at all   Food Insecurity: No Food Insecurity    Worried About Running Out of Food in the Last Year: Never true    [address] in the Last Year: Never true   Transportation Needs: No Transportation Needs    Lack of Transportation (Medical): No    Lack of Transportation (Non-Medical):  No   Physical Activity: Insufficiently Active    Days of Exercise per Week: 2 days    Minutes of Exercise per Session: 50 min   Stress: No Stress Concern Present    Feeling of Stress : Not at all   Social Connections: Moderately Isolated    Frequency of Communication with Friends and Family: Three times a week    Frequency of Social Gatherings with Friends and Family: Three times a week    Attends Confucianism Services: Never    Active Member of Clubs or Organizations: No    Attends Club or Organization Meetings: Never    Marital Status: Living with partner   Intimate Partner Violence: Not At Risk    Fear of Current or Ex-Partner: No    Emotionally Abused: No    Physically Abused: No    Sexually Abused: No   Housing Stability: Unknown    Unable to Pay for Housing in the Last Year: No    Number of [place] in the Last Year: Not on file    Unstable Housing in the Last Year: No     Family History:       Problem Relation Age of Onset    No Known Problems Mother     No Known Problems Father     No Known Problems Sister     No Known Problems Brother     No Known Problems Brother     Heart Disease Brother     Heart Disease Brother     Other Brother      Medications Prior to Admission:  Medications Prior to Admission: Prenatal MV-Min-Fe Fum-FA-DHA (PRENATAL 1 PO), Take 1 tablet by mouth daily  Cholecalciferol 50 MCG (2000 UT) TABS, Take 2,000 Int'l Units by mouth daily    REVIEW OF SYSTEMS:  negative     PHYSICAL EXAM:    Vitals:    04/28/22 1021 04/28/22 1026 04/28/22 1031 04/28/22 1033   BP:       Pulse:       Resp:       Temp:       TempSrc:       SpO2: 94% 94% 94% 96%   Weight:       Height:         General appearance:  awake, alert, cooperative, no apparent distress, and appears stated age  Neurologic:  Awake, alert, oriented to name, place and time.     Lungs:  No increased work of breathing, good air exchange  Abdomen:  Soft, non tender, gravid, fundal height consistent with the gestational age, EFW by Leopold's maneuvers is AGA  Pelvis:  Adequate pelvis  Cervix: 3-4cm on admission  Contraction frequency: every 4-7 minutes  Membranes:  Intact; AROM performed and thin meconium noted  Labs:   CBC:   Lab Results   Component Value Date    WBC 13.5 04/28/2022    RBC 4.03 04/28/2022    HGB 11.4 04/28/2022    HCT 35.2 04/28/2022    MCV 87.3 04/28/2022    MCH 28.3 04/28/2022    MCHC 32.4 04/28/2022    RDW 13.6 04/28/2022     04/28/2022    MPV 7.6 04/28/2022       ASSESSMENT:  Irregular uterine contractions    PLAN: Admit  Labor: Routine labor orders  Fetus: Reassuring  GBS: Positive    I have presented reasonable alternatives to the patient's proposed care, treatment, and services. The discussion I have done encompassed risks, benefits, and side effects related to the alternatives and the risks related to not receiving the proposed care, treatment, and services. All questions answered. Patient wishes to proceed.      Electronically signed by Khushboo Salcido MD on 4/28/2022 at 10:59 AM

## 2022-04-28 NOTE — ANESTHESIA POSTPROCEDURE EVALUATION
Department of Anesthesiology  Postprocedure Note    Patient: Latoya Zapata  MRN: [id number]  YOB: 2000  Date of evaluation: 4/28/2022  Time:  6:24 PM     Procedure Summary     Date: 04/28/22 Room / Location:     Anesthesia Start: 0907 Anesthesia Stop: 5261    Procedure: Labor Analgesia Diagnosis:     Scheduled Providers:  Responsible Provider: Rajendra Saez MD    Anesthesia Type: epidural ASA Status: 2          Anesthesia Type: epidural    Maria Elena Phase I: Maria Elena Score: 10    Maria Elena Phase II: Maria Elena Score: 10    Last vitals: Reviewed and per EMR flowsheets.        Anesthesia Post Evaluation    Patient location during evaluation: bedside  Patient participation: complete - patient participated  Level of consciousness: awake and alert  Airway patency: patent  Nausea & Vomiting: no nausea and no vomiting  Complications: no  Cardiovascular status: hemodynamically stable  Respiratory status: room air, spontaneous ventilation and acceptable  Hydration status: stable    /61   Pulse 81   Temp 36.8 °C (98.2 °F) (Oral)   Resp 16   Ht 5' 1\" (1.549 m)   Wt 200 lb (90.7 kg)   LMP 07/22/2021   SpO2 98%   Breastfeeding Unknown   BMI 37.79 kg/m²

## 2022-04-28 NOTE — ANESTHESIA PRE PROCEDURE
Department of Anesthesiology  Preprocedure Note       Name:  Ulysses Thompson   Age:  22 y.o.  :  2000                                          MRN:  [id number]         Date:  2022      Procedure: Labor Epidural    Medications prior to admission:   Prior to Admission medications    Medication Sig Start Date End Date Taking?  Authorizing Provider   Prenatal MV-Min-Fe Fum-FA-DHA (PRENATAL 1 PO) Take 1 tablet by mouth daily    Historical Provider, MD   Cholecalciferol 50 MCG (2000) TABS Take 2,000 Int'l Units by mouth daily    Historical Provider, MD       Current medications:    Current Facility-Administered Medications   Medication Dose Route Frequency Provider Last Rate Last Admin    lactated ringers infusion   IntraVENous Continuous Richy Escudero MD        lactated ringers bolus  500 mL IntraVENous PRN Richy Escudero MD        Or    lactated ringers bolus  1,000 mL IntraVENous PRN Richy Escudero .9 mL/hr at 22 0814 1,000 mL at 22 0814    sodium chloride flush 0.9 % injection 5-40 mL  5-40 mL IntraVENous 2 times per day Richy Escudero MD        sodium chloride flush 0.9 % injection 5-40 mL  5-40 mL IntraVENous PRN Richy Escudero MD        0.9 % sodium chloride infusion  25 mL IntraVENous PRN Richy Escudero MD        oxytocin (PITOCIN) 30 units in 500 mL infusion  87.3 johann-units/min IntraVENous Continuous PRN Richy Escudero MD        And    oxytocin (PITOCIN) 30 units in 500 mL infusion  10 Units IntraVENous PRN Richy Escudero MD        ondansetron [facility]) injection 4 mg  4 mg IntraVENous Q6H PRN Richy sEcudero MD        oxytocin (PITOCIN) 30 units in 500 mL infusion  1-20 johann-units/min IntraVENous Continuous Richy Escudero MD 1 mL/hr at 22 0810 1 johann-units/min at 22 0810    penicillin G potassium 5 Million Units in dextrose 5 % 100 mL IVPB (mini-bag)  5 Million Units IntraVENous Once Richy Escudero  mL/hr at 22 0754 5 Million Units at 04/28/22 0754    Followed by   Zannie Cowden penicillin G potassium in d5w IVPB 2.5 Million Units  2.5 Million Units IntraVENous Q4H Bonifacio Pedraza MD        terbutaline (BRETHINE) injection 0.25 mg  0.25 mg SubCUTAneous Once Bonifacio Pedraza MD        naloxone 0.4 mg in 10 mL sodium chloride syringe   IntraVENous PRN Subhash Weber MD        fentaNYL 3mcg/ml bupivacaine 0.125% in sodium chloride 0.9% 250mL epidural  10 mL/hr Epidural Continuous Subhash Weber MD           Allergies:  No Known Allergies    Problem List:    Patient Active Problem List   Diagnosis Code    Irregular uterine contractions O47.9    Normal labor and delivery O80       Past Medical History:  History reviewed. No pertinent past medical history. Past Surgical History:        Procedure Laterality Date    EYE SURGERY         Social History:    Social History     Tobacco Use    Smoking status: Never Smoker    Smokeless tobacco: Never Used   Substance Use Topics    Alcohol use: Never                                Counseling given: Not Answered      Vital Signs (Current):   Vitals:    04/28/22 0147   BP: 135/84   Pulse: 85   Resp: 18   Temp: 37.2 °C (98.9 °F)   TempSrc: Oral   SpO2: 98%   Weight: 200 lb (90.7 kg)   Height: 5' 1\" (1.549 m)                                              BP Readings from Last 3 Encounters:   04/28/22 135/84   03/03/22 120/64   09/11/21 100/65       NPO Status:                                                                                 BMI:   Wt Readings from Last 3 Encounters:   04/28/22 200 lb (90.7 kg)   03/03/22 190 lb (86.2 kg)   09/11/21 157 lb 10.1 oz (71.5 kg)     Body mass index is 37.79 kg/m².     CBC:   Lab Results   Component Value Date    WBC 13.5 04/28/2022    RBC 4.03 04/28/2022    HGB 11.4 04/28/2022    HCT 35.2 04/28/2022    MCV 87.3 04/28/2022    RDW 13.6 04/28/2022     04/28/2022       CMP:   Lab Results   Component Value Date     09/11/2021    K 3.5 09/11/2021    CL 100 2021    CO2 21 2021    BUN 6 2021    CREATININE <0.5 2021    GFRAA >60 2021    LABGLOM >60 2021    GLUCOSE 121 2021    CALCIUM 9.1 2021         Anesthesia Evaluation  Patient summary reviewed and Nursing notes reviewed no history of anesthetic complications:   Airway: Mallampati: II  TM distance: >3 FB     Mouth opening: > = 3 FB Dental: normal exam         Pulmonary:Negative Pulmonary ROS and normal exam  breath sounds clear to auscultation      (-) asthma, shortness of breath and recent URI                           Cardiovascular:Negative CV ROS  Exercise tolerance: good (>4 METS),       (-) hypertension and CAD      Rhythm: regular  Rate: normal                    Neuro/Psych:   Negative Neuro/Psych ROS              GI/Hepatic/Renal: Neg GI/Hepatic/Renal ROS       (-) GERD, liver disease and no renal disease       Endo/Other: Negative Endo/Other ROS   (+) blood dyscrasia: anemia:., .    (-) diabetes mellitus               Abdominal:             Vascular: negative vascular ROS. - DVT and PE. Other Findings:             Anesthesia Plan      epidural     ASA 2     (Plan for a labor epidural. Plan and risks discussed with patient including but not limited to changes in HR, B/P and oxygen status; N/V; infection; headache; inadequate block or need to replace epidural. Questions answered. Patient agrees to [address].       )        Anesthetic plan and risks discussed with patient. OB History        1    Para        Term                AB        Living           SAB        IAB        Ectopic        Molar        Multiple   1    Live Births                    Delmy Mckeon is a 22y.o. year-old female admitted to Concepcion Collet, MD for contractions. Gestational age is 38w11d. Her Body mass index is 37.79 kg/m². She was seen, examined and her chart was reviewed (including anesthesia, drug and allergy history).   No interval changes are noted to her history and physical examination. (except as noted above).     Risks/benefits/alternatives of both neuraxial and general anesthesia were discussed and she agrees to proceed at the direction of the care team.    BILL Sullivan - CRNA  April 28, 2022  8:19 AM        BILL Sullivan CRNA   4/28/2022

## 2022-04-29 PROCEDURE — 1220000000 HC SEMI PRIVATE OB R&B

## 2022-04-29 PROCEDURE — 6370000000 HC RX 637 (ALT 250 FOR IP): Performed by: OBSTETRICS & GYNECOLOGY

## 2022-04-29 RX ORDER — DOCUSATE SODIUM 100 MG/1
100 CAPSULE, LIQUID FILLED ORAL 2 TIMES DAILY PRN
Qty: 60 CAPSULE | Refills: 1 | Status: SHIPPED | OUTPATIENT
Start: 2022-04-29

## 2022-04-29 RX ORDER — IBUPROFEN 800 MG/1
800 TABLET ORAL EVERY 8 HOURS PRN
Qty: 40 TABLET | Refills: 1 | Status: SHIPPED | OUTPATIENT
Start: 2022-04-29

## 2022-04-29 RX ADMIN — IBUPROFEN 800 MG: 800 TABLET, FILM COATED ORAL at 07:50

## 2022-04-29 RX ADMIN — ACETAMINOPHEN 650 MG: 325 TABLET ORAL at 11:42

## 2022-04-29 RX ADMIN — IBUPROFEN 800 MG: 800 TABLET, FILM COATED ORAL at 20:01

## 2022-04-29 RX ADMIN — DOCUSATE SODIUM 100 MG: 100 CAPSULE, LIQUID FILLED ORAL at 07:49

## 2022-04-29 RX ADMIN — DOCUSATE SODIUM 100 MG: 100 CAPSULE, LIQUID FILLED ORAL at 20:00

## 2022-04-29 RX ADMIN — BENZOCAINE AND LEVOMENTHOL: 200; 5 SPRAY TOPICAL at 07:49

## 2022-04-29 ASSESSMENT — PAIN SCALES - GENERAL
PAINLEVEL_OUTOF10: 1
PAINLEVEL_OUTOF10: 4
PAINLEVEL_OUTOF10: 3
PAINLEVEL_OUTOF10: 1

## 2022-04-29 ASSESSMENT — PAIN - FUNCTIONAL ASSESSMENT
PAIN_FUNCTIONAL_ASSESSMENT: ACTIVITIES ARE NOT PREVENTED

## 2022-04-29 ASSESSMENT — PAIN DESCRIPTION - DESCRIPTORS
DESCRIPTORS: CRAMPING;SORE
DESCRIPTORS: CRAMPING
DESCRIPTORS: CRAMPING
DESCRIPTORS: CRAMPING;SORE
DESCRIPTORS: CRAMPING
DESCRIPTORS: CRAMPING;SORE

## 2022-04-29 ASSESSMENT — PAIN DESCRIPTION - LOCATION
LOCATION: ABDOMEN

## 2022-04-29 ASSESSMENT — PAIN DESCRIPTION - ORIENTATION
ORIENTATION: LOWER

## 2022-04-29 ASSESSMENT — PAIN DESCRIPTION - RADICULAR PAIN: RADICULAR_PAIN: ABSENT

## 2022-04-29 ASSESSMENT — PAIN DESCRIPTION - ONSET
ONSET: ON-GOING

## 2022-04-29 ASSESSMENT — PAIN DESCRIPTION - PAIN TYPE
TYPE: ACUTE PAIN

## 2022-04-29 ASSESSMENT — PAIN DESCRIPTION - FREQUENCY
FREQUENCY: INTERMITTENT

## 2022-04-29 NOTE — PROGRESS NOTES
CLINICAL PHARMACY NOTE: MEDS TO BEDS    Discharge medications are ready in inpatient pharmacy for weekend delivery.     04/29/22 3:43 PM

## 2022-04-29 NOTE — FLOWSHEET NOTE
Explained infant orders to both parents. Infant to have chest X-ray, oxygen flow increased to 2L, possible NG placement for feedings. All questions answered.

## 2022-04-29 NOTE — PROGRESS NOTES
[facility] Department of Anesthesiology  Post-Anesthesia Note       Name:  Thaddeus Reynoso                                         Age:  22 y.o. MRN:  [id number]     Last Vitals & Oxygen Saturation: /83   Pulse 76   Temp 36.5 °C (97.7 °F) (Oral)   Resp 16   Ht 5' 1\" (1.549 m)   Wt 200 lb (90.7 kg)   LMP 07/22/2021   SpO2 98%   Breastfeeding Unknown   BMI 37.79 kg/m²   No data found.     Level of consciousness: awake, alert and oriented    Respiratory: stable     Cardiovascular: stable     Hydration: stable     PONV: stable     Post-op pain: adequate analgesia    Post-op assessment: no apparent anesthetic complications and tolerated procedure well    Complications:  none    [address], BILL - CRNA  April 29, 2022   2:42 PM

## 2022-04-29 NOTE — LACTATION NOTE
This note was copied from a baby's chart. LC to SCN. Infant sleepy at this time. Tried several gentle wake up techniques. Infant would take the bottle, but refused to even attempt to latch directly to the breast. Since infant has been receiving bottles since birth, discussed trying a nipple shield to at least get infant to the breast. Mother agreeable to this plan. I discussed in length the temporary use of a nipple shield with mother. I gave and reviewed Care Plan with mother. I reviewed cleaning, application, and risks and benefits including possible impaired milk intake by infant and impaired milk production of mother. I educated mother to use a breast pump after each feeding with nipple shield. I stressed the importance of follow up with her pediatrician and Lactation. I gave instructions and tips on weaning from nipple shield within 1-2 weeks. Infant latched well with nipple shield a nursed for a few minutes, infant stayed attached for about 10 minutes but with minimal sucking after the 3. Colostrum noted in shield when infant came off. Infant was then offered more bottle, and was able to take a total of 18 mL formula. Encouraged mother to come back to [facility] for as many feedings as possible to attempt to breastfeed before offering bottle of EBM/formula. Discussed pumping plan. Encouraged mother to pump every 3 hours until infant is consistently nursing well at the breast. Will begin pumping when mother is ready.

## 2022-04-29 NOTE — DISCHARGE SUMMARY
Department of Obstetrics and Gynecology  Postpartum Discharge Summary      Admit Date: 2022    Admit Diagnosis: Irregular uterine contractions [O47.9]  Normal labor and delivery [O80]    Discharge Date: 2022 (Summary placed the day prior to discharge for planning purposes and updated as needed)    Discharge Diagnoses: spontaneous vaginal delivery       Medication List      START taking these medications    docusate sodium 100 MG capsule  Commonly known as: COLACE  Take 1 capsule by mouth 2 times daily as needed for Constipation     ibuprofen 800 MG tablet  Commonly known as: ADVIL;MOTRIN  Take 1 tablet by mouth every 8 hours as needed for Pain        CONTINUE taking these medications    Cholecalciferol 50 MCG ( UT) Tabs     PRENATAL 1 PO           Where to Get Your Medications      These medications were [facility] to [facility], [address] [facility] [phone]  [address] [facility] [address] [place] [id number]    Phone: [phone]   · docusate sodium 100 MG capsule  · ibuprofen 800 MG tablet         Service: Obstetrics    Consults: none    Significant Diagnostic Studies: none    Postpartum complications: none     Condition at Discharge: good    Hospital Course: uncomplicated    Discharge Instructions: Activity: no sex for 6 weeks and as tolerated    Diet: regular diet    Instructions: No intercourse and nothing in the vagina for 6 weeks. Do not drive while using pain medications.  Keep any wounds clean and dry    Discharge to: Home    Disposition / Follow up: Return to office in 6 weeks    Home Health Nurse visit within 24-48 h if qualifies    [place] Data:  Weight   Information for the patient's :  Isa Ibarra Girl Meka Solano [[id number]]        [employer]pgars   Information for the patient's :  [facility] [[id number]]         Home with mother    Electronically signed by Wyatt Marroquin MD on 2022 at 8:51 AM

## 2022-04-29 NOTE — PLAN OF CARE
Problem: Pain  Goal: Verbalizes/displays adequate comfort level or baseline comfort level  Outcome: Progressing     Problem: Postpartum  Goal: Experiences normal postpartum course  Description:  Postpartum OB-Pregnancy care plan goal which identifies if the mother is experiencing a normal postpartum course  Outcome: Progressing  Goal: Appropriate maternal -  bonding  Description:  Postpartum OB-Pregnancy care plan goal which identifies if the mother and  are bonding appropriately  Outcome: Progressing  Goal: Establishment of infant feeding pattern  Description:  Postpartum OB-Pregnancy care plan goal which identifies if the mother is establishing a feeding pattern with their   Outcome: Progressing  Goal: Incisions, wounds, or drain sites healing without S/S of infection  Outcome: Progressing     Problem: Infection - Adult  Goal: Absence of infection at discharge  Outcome: Progressing  Goal: Absence of infection during hospitalization  Outcome: Progressing  Goal: Absence of fever/infection during anticipated neutropenic period  Outcome: Progressing     Problem: Discharge Planning  Goal: Discharge to home or other facility with appropriate resources  Outcome: Progressing

## 2022-04-29 NOTE — PROGRESS NOTES
Department of Obstetrics and Gynecology  Vaginal Delivery Postpartum Rounds    SUBJECTIVE:  Pain is controlled with non-steroidal anti-inflammatory drugs. Her lochia is normal.  Female infant in SCN. OBJECTIVE:  Vital Signs: /61   Pulse 84   Temp 97.5 °F (36.4 °C) (Oral)   Resp 16   Ht 5' 1\" (1.549 m)   Wt 200 lb (90.7 kg)   LMP 2021   SpO2 98%   Breastfeeding Unknown   BMI 37.79 kg/m²   Appearance/Psychiatric: awake, alert, cooperative, no apparent distress, appears stated age  Constitutional: The patient is well nourished. Cardiovascular: She does not have edema. Respiratory: Respiratory effort is normal.  Gastrointestinal: Soft, non tender, uterine fundus is firm below umbilicus  Extremities: nontender to palpation    LABS / IMAGING:  CBC:   Lab Results   Component Value Date    WBC 13.5 2022    RBC 4.03 2022    HGB 11.4 2022    HCT 35.2 2022    MCV 87.3 2022    MCH 28.3 2022    MCHC 32.4 2022    RDW 13.6 2022     2022    MPV 7.6 2022       ASSESSMENT:    Postpartum Day 1 s/p     PLAN:   1. Continue routine postpartum care   2. Discharge home on Postpartum Day 2  3. Return to office in 6 weeks   4.  Postpartum instructions reviewed and all patient's Questions answered    Electronically signed by Shirley Marroquin MD on 2022 at 7:21 AM

## 2022-04-29 NOTE — LACTATION NOTE
This note was copied from a baby's chart. LC to room. Mother states she is interested in learning more about breastfeeding. She originally thought she would bottle feed with formula, but she has since changed her mind. Discussed plan to attempt breastfeeding at next feeding attempt (1200, infant is in SCN and on a 9,12,3,6 schedule right now). Also discussed pumping to help protect milk supply since infant is in SCN and until infant begins nursing well at the breast with each feeding. Encouraged mother to communicate any questions/concerns/desires as far as infant feeding goes so that we may help her meet her goals. The mother requests I initiate process for a breast pump through insurance. I faxed insurance information and prescription for breast pump to [facility]. Lactation will f/u at noon feeding.

## 2022-04-29 NOTE — FLOWSHEET NOTE
Pt A&OX4, respirations even and unlabored. VSS. Pt reports pain of 4/10, medication given see MAR. Pt reports emptying bladder with each void and denies passing any clots. Bleeding is small and fundus is firm, midline, U/U. Pt denies any other needs at this time. Will continue to monitor.

## 2022-04-29 NOTE — LACTATION NOTE
This note was copied from a baby's chart. LC called to mother's room to show her how to use the breast pump. Set up [employer] pump and explained use and cleaning. Mother pumped for 30 minutes and obtained 23 mL colostrum. Milk labeled and taken to [facility] to [facility]. Mother plans to pump on a 4,7,10,1 schedule so that ideally she always has some milk available for infant's feeding times. Mother set her alarm on her phone as reminder to pump around these times. Discussed pumping for 15-20 minutes. At first pumping session, mother obtained the majority of the volume pumped in the first 5-7 minutes.

## 2022-04-29 NOTE — FLOWSHEET NOTE
Patient A&Ox4. Sitting in bed pumping breastmilk. A couple questions answered regarding pumping and pain. Patient requested pain medication at this time before going to [facility] for babies feeding. Patient reports pain is mild. Patient denies further questions at this time. Will continue to monitor.

## 2022-04-30 VITALS
OXYGEN SATURATION: 97 % | RESPIRATION RATE: 16 BRPM | HEIGHT: 61 IN | TEMPERATURE: 97.6 F | BODY MASS INDEX: 37.76 KG/M2 | HEART RATE: 70 BPM | DIASTOLIC BLOOD PRESSURE: 65 MMHG | WEIGHT: 200 LBS | SYSTOLIC BLOOD PRESSURE: 117 MMHG

## 2022-04-30 PROCEDURE — 6370000000 HC RX 637 (ALT 250 FOR IP): Performed by: OBSTETRICS & GYNECOLOGY

## 2022-04-30 RX ADMIN — IBUPROFEN 800 MG: 800 TABLET, FILM COATED ORAL at 06:27

## 2022-04-30 RX ADMIN — DOCUSATE SODIUM 100 MG: 100 CAPSULE, LIQUID FILLED ORAL at 10:26

## 2022-04-30 ASSESSMENT — PAIN SCALES - GENERAL: PAINLEVEL_OUTOF10: 2

## 2022-04-30 ASSESSMENT — PAIN DESCRIPTION - DESCRIPTORS: DESCRIPTORS: CRAMPING

## 2022-04-30 ASSESSMENT — PAIN DESCRIPTION - ORIENTATION: ORIENTATION: LOWER

## 2022-04-30 ASSESSMENT — PAIN DESCRIPTION - RADICULAR PAIN: RADICULAR_PAIN: ABSENT

## 2022-04-30 ASSESSMENT — PAIN DESCRIPTION - LOCATION: LOCATION: ABDOMEN

## 2022-04-30 ASSESSMENT — PAIN - FUNCTIONAL ASSESSMENT: PAIN_FUNCTIONAL_ASSESSMENT: ACTIVITIES ARE NOT PREVENTED

## 2022-04-30 NOTE — PLAN OF CARE
Problem: Pain  Goal: Verbalizes/displays adequate comfort level or baseline comfort level  2022 1132 by Joana Macias RN  Outcome: Completed  2022 5456 by Lisa Yee RN  Outcome: Progressing  2022 2359 by Lisa Yee RN  Outcome: Progressing     Problem: Postpartum  Goal: Experiences normal postpartum course  Description:  Postpartum OB-Pregnancy care plan goal which identifies if the mother is experiencing a normal postpartum course  2022 1132 by Joana Macias RN  Outcome: Completed  2022 3942 by Lisa Yee RN  Outcome: Progressing  2022 2359 by Lisa Yee RN  Outcome: Progressing  Goal: Appropriate maternal -  bonding  Description:  Postpartum OB-Pregnancy care plan goal which identifies if the mother and  are bonding appropriately  2022 1132 by Joana Macias RN  Outcome: Completed  2022 7802 by Lisa Yee RN  Outcome: Progressing  2022 2359 by Lisa Yee RN  Outcome: Progressing  Goal: Establishment of infant feeding pattern  Description:  Postpartum OB-Pregnancy care plan goal which identifies if the mother is establishing a feeding pattern with their   2022 1132 by Joana Macias RN  Outcome: Completed  2022 0759 by Lisa Yee RN  Outcome: Progressing  2022 2359 by Lisa Yee RN  Outcome: Progressing  Goal: Incisions, wounds, or drain sites healing without S/S of infection  2022 1132 by Joana Macias RN  Outcome: Completed  2022 1681 by Lisa Yee RN  Outcome: Progressing  2022 2359 by Lisa Yee RN  Outcome: Progressing     Problem: Infection - Adult  Goal: Absence of infection at discharge  2022 1132 by Joana Macias RN  Outcome: Completed  2022 3601 by Lisa Yee RN  Outcome: Progressing  2022 2359 by Lisa Yee RN  Outcome: Progressing  Goal: Absence of infection during hospitalization  2022 1132 by Joana Macias RN  Outcome: Completed  4/30/2022 2765 by Valeriano Garcia RN  Outcome: Progressing  4/29/2022 2359 by Valeriano Garcia RN  Outcome: Progressing  Goal: Absence of fever/infection during anticipated neutropenic period  4/30/2022 1132 by Betsy Nuñez RN  Outcome: Completed  4/30/2022 7258 by Valeriano Garcia RN  Outcome: Progressing  4/29/2022 2359 by Valeriano Garcia RN  Outcome: Progressing     Problem: Discharge Planning  Goal: Discharge to home or other facility with appropriate resources  4/30/2022 1132 by Betsy Nuñez RN  Outcome: Completed  4/30/2022 0632 by Valeriano Garcia RN  Outcome: Progressing  4/29/2022 2359 by Valeriano Garcia RN  Outcome: Progressing

## 2022-04-30 NOTE — FLOWSHEET NOTE
Pt wanting to go back to sleep after vitals done, told pt to call this RN when awake for rest of morning assessment. White board updated, call light within reach.

## 2022-04-30 NOTE — FLOWSHEET NOTE
Pt still back in SCN with infant, denies needing anything at this time. Pt's discharge meds brought up from pharmacy.

## 2022-04-30 NOTE — FLOWSHEET NOTE
Report received from Nate DUNHAM, pt resting in bed with her eyes closed, infant in SCN- bedside report deferred for pt comfort.

## 2022-04-30 NOTE — FLOWSHEET NOTE
Pt back to her room from [facility], assessment finished. Discharge meds given to pt. Postpartum teaching completed and forms signed by patient. Copy witnessed by RN and given to patient. Patient verbalized understanding of all teaching points. Patient plans to follow-up with [facility] Provider as instructed. Patient verbalizes understanding of discharge instructions and denies further questions. Pt rooming in while infant in [facility].

## 2022-04-30 NOTE — FLOWSHEET NOTE
Pt up waking in hallway on her way to SCN to feed infant, told her to let this RN know when she is done to complete her morning assessment.

## 2022-04-30 NOTE — PLAN OF CARE
Problem: Pain  Goal: Verbalizes/displays adequate comfort level or baseline comfort level  2022 0632 by Jerri Brothers RN  Outcome: Progressing  2022 2359 by Jerri Brothers RN  Outcome: Progressing     Problem: Postpartum  Goal: Experiences normal postpartum course  Description:  Postpartum OB-Pregnancy care plan goal which identifies if the mother is experiencing a normal postpartum course  2022 0632 by Jerri Brothers RN  Outcome: Progressing  2022 2359 by Jerri Brothers RN  Outcome: Progressing  Goal: Appropriate maternal -  bonding  Description:  Postpartum OB-Pregnancy care plan goal which identifies if the mother and  are bonding appropriately  2022 0632 by Jerri Brothers RN  Outcome: Progressing  2022 2359 by Jerri Brothers RN  Outcome: Progressing  Goal: Establishment of infant feeding pattern  Description:  Postpartum OB-Pregnancy care plan goal which identifies if the mother is establishing a feeding pattern with their   2022 2035 by Jerri Brothers RN  Outcome: Progressing  2022 2359 by Jerri Brothers RN  Outcome: Progressing  Goal: Incisions, wounds, or drain sites healing without S/S of infection  2022 4094 by Jerri Brothers RN  Outcome: Progressing  2022 2359 by Jerri Brothers RN  Outcome: Progressing     Problem: Infection - Adult  Goal: Absence of infection at discharge  2022 7085 by Jerri Brothers RN  Outcome: Progressing  2022 2359 by Jerri Brothers RN  Outcome: Progressing  Goal: Absence of infection during hospitalization  2022 8781 by Jerri Brothers RN  Outcome: Progressing  2022 2359 by Jerri Brothers RN  Outcome: Progressing  Goal: Absence of fever/infection during anticipated neutropenic period  2022 4179 by Jerri Brothers RN  Outcome: Progressing  2022 2359 by Jerri Brothers RN  Outcome: Progressing     Problem: Discharge Planning  Goal: Discharge to home or other facility with appropriate resources  2022 6624 by Belen Gibbs Enrike Holloway  Outcome: Progressing  4/29/2022 2359 by Cee Monsalve, RN  Outcome: Progressing

## 2022-04-30 NOTE — FLOWSHEET NOTE
Patient awake in bed pumping breastmilk, c/o cramping slightly with pumping or feeding infant. Patient plans to pump and then lay down and sleep awhile since she has been going to the SCN for feedings throughout the night.

## 2023-05-23 NOTE — PROGRESS NOTES
Department of Obstetrics and Gynecology  Fetal surveillance testing summary    INDICATIONS:  contractions   OBJECTIVE RESULTS:  Time of the test: 09:40-10:00    Fetal surveillance: 135, reactive      Electronically signed by Modesto Tapia MD on 3/3/2022 at 11:09 AM n/a n/a

## 2023-10-16 LAB
HEP B, EXTERNAL RESULT: NEGATIVE
HEPATITIS C ANTIBODY, EXTERNAL RESULT: NEGATIVE
HIV, EXTERNAL RESULT: NORMAL
RUBELLA TITER, EXTERNAL RESULT: NORMAL
T. PALLIDUM (SYPHILIS) ANTIBODY, EXTERNAL RESULT: NORMAL

## 2024-03-25 ENCOUNTER — HOSPITAL ENCOUNTER (OUTPATIENT)
Age: 24
Discharge: HOME | End: 2024-03-25
Attending: OBSTETRICS & GYNECOLOGY | Admitting: OBSTETRICS & GYNECOLOGY
Payer: COMMERCIAL

## 2024-03-25 VITALS
WEIGHT: 186 LBS | DIASTOLIC BLOOD PRESSURE: 85 MMHG | HEART RATE: 102 BPM | RESPIRATION RATE: 18 BRPM | HEIGHT: 62 IN | OXYGEN SATURATION: 98 % | BODY MASS INDEX: 34.23 KG/M2 | TEMPERATURE: 98.3 F | SYSTOLIC BLOOD PRESSURE: 133 MMHG

## 2024-03-25 LAB
C. TRACHOMATIS, EXTERNAL RESULT: NEGATIVE
GBS, EXTERNAL RESULT: NEGATIVE
N. GONORRHOEAE, EXTERNAL RESULT: NEGATIVE

## 2024-03-25 PROCEDURE — 59025 FETAL NON-STRESS TEST: CPT

## 2024-03-25 PROCEDURE — 99213 OFFICE O/P EST LOW 20 MIN: CPT

## 2024-03-25 NOTE — DISCHARGE INSTRUCTIONS
Follow up in the office as scheduled next week. Call the office or on call provider with any concerns that arise prior to your appointment.     Home Undelivered Discharge Instructions    After Discharge Orders:            Diet:  normal diet as tolerated    Rest: normal activity as tolerated      Activity/Restrictions:    Rise from laying/sitting position to standing slowly.  Avoid laying on your back, sidelying positions are best, left side is preferred.  If you had a vaginal exam you may have some bloody mucous or brown vaginal discharge.  If your doctor/midwife has ordered antibiotics, get it filled right away and take all of the medication as it has been prescribed.  DO NOT take any medication that is not approved by your physician first.    When to call your doctor:    If you have severe back pain.  Period-like cramps that may come and go.   Low, dull backache, not relieved by rest.  Pressure in your vagina or lower abdomen that may feel like the baby is pushing down.  Change in the type or amount of vaginal discharge.  Abdominal cramps that may be accompanied by diarrhea.  Contractions that are regular, coming approximately every 4-5 minutes.  Fluid leaking from your vagina (may or may not be bloody)  If you have vaginal bleeding.  If you have a temperature of 100.6 or more.  If your baby has a decrease in activity.  Less than 5 movements in an hour. (see fetal kick counts)  If you feel you are not getting better or you are concerned.  If you develop a headache, not resolved with your usual interventions.  If you have changes in your vision (blurring or spots in your vision).  If you have burning with urination, urgency or frequency.  If you have pain in your abdomen, up by your ribs.    If you have an emergency, call 911 or report to the nearest emergency room.    General Instructions:    Nausea and Vomiting  Keep dry toast/crackers with you to snack on.  Eat small, frequent meals  Try to keep something in

## 2024-03-25 NOTE — FLOWSHEET NOTE
03/25/24 1430   Fetal Heart Rate   Mode External US   Baseline Rate 150 bpm   Baseline Classification Normal   Variability 6-25 BPM   Pattern Accelerations   Patient Feels Fetal Movement Yes   Fetal Monitoring Strip   FMS Reviewed? Yes   FMS Reviewed By? Dr Nguyễn   Uterine Activity   UA Mode Palpation;[place]   Contractions Irregular   Contraction Duration 30-50   Contraction Intensity Mild;N/A   Resting Tone Palpated Soft

## 2024-03-25 NOTE — FLOWSHEET NOTE
After completion of the medical screening evaluation, it has been determined that a medical emergency does not exist and the patient is not in active labor. Therefore, the patient may be discharged home.    Patient given triage discharge instructions.  Patient instructed to drink plenty water everyday, to resume regular diet and activity.   All pregnancy warning signs and symptoms reviewed.  Fetal kick counts reviewed.  Patient aware of when to follow up with provider and all questions answered.  Patient ambulatory home with instructions.

## 2024-03-25 NOTE — FLOWSHEET NOTE
Pt arrived to triage room A from office for reports of decreased fetal movement and Dr Sena's office requesting \"NST & JEWELL\". EFM/[place] to central bank monitoring. Abd soft and nontender. Pt denies bleeding/leaking or other concerns. Dr Nguyễn aware of pt arrival and will be out to evaluate pt.

## 2024-03-25 NOTE — PROGRESS NOTES
Department of Obstetrics and Gynecology  Triage Evaluation Note    SUBJECTIVE:  Mary Blake is a 23 y.o.,  at 36w3d who presents for decreased fetal movement. She denies vaginal bleeding, leakage of fluid, or painful contractions. Feeling occ mild contractions. She states the baby is moving, but less than usual. She denies fever, shortness of breath, palpitations, nausea, vomiting, diarrhea on ROS.    I personally reviewed the past medical and surgical histories, as well as the problem list.    OBJECTIVE:  Vital Signs: /85   Pulse (!) 102   Temp 98.3 °F (36.8 °C) (Oral)   Resp 18   Ht 1.575 m (5' 2\")   Wt 84.4 kg (186 lb)   SpO2 98%   BMI 34.02 kg/m²   Appearance/Psychiatric: alert and oriented X3  Constitutional: Appears well, no distress  Cardiovascular: She does not have edema.  Respiratory:Respiratory effort is normal.  Gastrointestinal: soft, non tender, Gravid. The uterine size is equal to dates.    Pelvic: Cervix not examined.   NST read: 150 reactive, no decles, good variability  [place]: irritability    Bedside ultrasound: JEWELL 8.5 cm    ASSESSMENT AND PLAN:  23 y.o.  at 36 3/7 weeks, Decreased FM- NST reactive and JEWELL normal.     The patient will follow up in 1 weeks. She was counseled to call or return for vaginal bleeding, regular contractions, leakage of fluid or decreased fetal movement.    Electronically signed by Prerna Nguyễn MD on 3/25/2024 at 2:30 PM

## 2024-03-27 ENCOUNTER — HOSPITAL ENCOUNTER (OUTPATIENT)
Age: 24
Discharge: HOME | End: 2024-03-27
Attending: OBSTETRICS & GYNECOLOGY | Admitting: OBSTETRICS & GYNECOLOGY
Payer: COMMERCIAL

## 2024-03-27 VITALS
HEIGHT: 62 IN | HEART RATE: 75 BPM | DIASTOLIC BLOOD PRESSURE: 67 MMHG | SYSTOLIC BLOOD PRESSURE: 113 MMHG | BODY MASS INDEX: 34.23 KG/M2 | WEIGHT: 186 LBS | RESPIRATION RATE: 15 BRPM | OXYGEN SATURATION: 98 % | TEMPERATURE: 98 F

## 2024-03-27 PROCEDURE — 99213 OFFICE O/P EST LOW 20 MIN: CPT

## 2024-03-27 PROCEDURE — 59025 FETAL NON-STRESS TEST: CPT

## 2024-03-27 NOTE — FLOWSHEET NOTE
Dr Sena to bedside. Speculum exam performed by MD- swab taken for Fern testing. SVE:unchanged from office visit this week: 2/40/-3.

## 2024-03-27 NOTE — FLOWSHEET NOTE
Patient at 36.5 weeks, ambulatory to triage for evaluation of possible rupture.  Patient given gown to change into and oriented to room.  Plan of care reviewed.  Patient verbalized understanding.  EFM applied with consent to central monitor bank with alarms on.  Uterus soft and non-tender.  Positive fetal movement per pt.  Denies contractions and has had no vaginal bleeding.

## 2024-03-27 NOTE — FLOWSHEET NOTE
Triage discharge instructions reviewed with pt. Patient instructed to increase oral fluids to 8-10 glasses of water or fruit juice everyday, to resume regular diet and activity.   All pregnancy warning signs and symptoms reviewed.  Fetal kick counts reviewed.  Patient aware of when to follow up with provider and all questions answered.  Patient ambulatory home with instructions.  Verbalized understanding.  Denies questions. No new scrips. Pt to follow up at next scheduled apt next Monday in office.

## 2024-03-27 NOTE — FLOWSHEET NOTE
03/27/24 1251   Fetal Heart Rate   Mode External US   Baseline Rate 145 bpm   Baseline Classification Normal   Variability 6-25 BPM   Pattern Accelerations   Patient Feels Fetal Movement Yes   Interventions RN at Bedside;Provider at Bedside  (ok to take off monitors at this time per Dr. Sena)   OB Bladder Status Non-distended

## 2024-03-27 NOTE — PROGRESS NOTES
@ 36wks c/o ? LOF. No ctx/VB  SSE-no pool, neg valsalva  Fern neg   reactive  [place]-neg  Cx-2/40  DC. Precautions. RTC as scheduled.  After completion of the Medical Screening Evaluation, it has been determined that a medical emergency does not exist and the patient is not in active labor, and therefore the patient may be discharged home.

## 2024-04-16 ENCOUNTER — HOSPITAL ENCOUNTER (INPATIENT)
Age: 24
LOS: 1 days | Discharge: HOME | End: 2024-04-18
Attending: OBSTETRICS & GYNECOLOGY | Admitting: OBSTETRICS & GYNECOLOGY
Payer: COMMERCIAL

## 2024-04-17 ENCOUNTER — ANESTHESIA EVENT (OUTPATIENT)
Dept: LABOR AND DELIVERY | Age: 24
End: 2024-04-17
Payer: COMMERCIAL

## 2024-04-17 ENCOUNTER — ANESTHESIA (OUTPATIENT)
Dept: LABOR AND DELIVERY | Age: 24
End: 2024-04-17
Payer: COMMERCIAL

## 2024-04-17 PROBLEM — Z37.9 NORMAL LABOR: Status: ACTIVE | Noted: 2024-04-17

## 2024-04-17 LAB
ABO + RH BLD: NORMAL
AMPHETAMINES UR QL SCN>1000 NG/ML: ABNORMAL
BARBITURATES UR QL SCN>200 NG/ML: ABNORMAL
BASOPHILS # BLD: 0.1 K/UL (ref 0–0.2)
BASOPHILS NFR BLD: 0.7 %
BENZODIAZ UR QL SCN>200 NG/ML: ABNORMAL
BLD GP AB SCN SERPL QL: NORMAL
BUPRENORPHINE+NOR UR QL SCN: ABNORMAL
CANNABINOIDS UR QL SCN>50 NG/ML: POSITIVE
COCAINE UR QL SCN: ABNORMAL
DEPRECATED RDW RBC AUTO: 14.2 % (ref 12.4–15.4)
DRUG SCREEN COMMENT UR-IMP: ABNORMAL
EOSINOPHIL # BLD: 0.3 K/UL (ref 0–0.6)
EOSINOPHIL NFR BLD: 2 %
FENTANYL SCREEN, URINE: ABNORMAL
HCT VFR BLD AUTO: 34.3 % (ref 36–48)
HGB BLD-MCNC: 11.6 G/DL (ref 12–16)
LYMPHOCYTES # BLD: 2.3 K/UL (ref 1–5.1)
LYMPHOCYTES NFR BLD: 18.1 %
MCH RBC QN AUTO: 28.2 PG (ref 26–34)
MCHC RBC AUTO-ENTMCNC: 33.7 G/DL (ref 31–36)
MCV RBC AUTO: 83.7 FL (ref 80–100)
METHADONE UR QL SCN>300 NG/ML: ABNORMAL
MONOCYTES # BLD: 1 K/UL (ref 0–1.3)
MONOCYTES NFR BLD: 8.2 %
NEUTROPHILS # BLD: 8.8 K/UL (ref 1.7–7.7)
NEUTROPHILS NFR BLD: 71 %
OPIATES UR QL SCN>300 NG/ML: ABNORMAL
OXYCODONE UR QL SCN: ABNORMAL
PCP UR QL SCN>25 NG/ML: ABNORMAL
PH UR STRIP: 7 [PH]
PLATELET # BLD AUTO: 326 K/UL (ref 135–450)
PMV BLD AUTO: 7.4 FL (ref 5–10.5)
RBC # BLD AUTO: 4.1 M/UL (ref 4–5.2)
REAGIN+T PALLIDUM IGG+IGM SERPL-IMP: NORMAL
WBC # BLD AUTO: 12.4 K/UL (ref 4–11)

## 2024-04-17 PROCEDURE — 51701 INSERT BLADDER CATHETER: CPT

## 2024-04-17 PROCEDURE — 2500000003 HC RX 250 WO HCPCS

## 2024-04-17 PROCEDURE — 10907ZC DRAINAGE OF AMNIOTIC FLUID, THERAPEUTIC FROM PRODUCTS OF CONCEPTION, VIA NATURAL OR ARTIFICIAL OPENING: ICD-10-PCS | Performed by: OBSTETRICS & GYNECOLOGY

## 2024-04-17 PROCEDURE — 86850 RBC ANTIBODY SCREEN: CPT

## 2024-04-17 PROCEDURE — 7200000001 HC VAGINAL DELIVERY

## 2024-04-17 PROCEDURE — 1220000000 HC SEMI PRIVATE OB R&B

## 2024-04-17 PROCEDURE — 80307 DRUG TEST PRSMV CHEM ANLYZR: CPT

## 2024-04-17 PROCEDURE — 6360000002 HC RX W HCPCS: Performed by: NURSE ANESTHETIST, CERTIFIED REGISTERED

## 2024-04-17 PROCEDURE — 3700000025 EPIDURAL BLOCK: Performed by: ANESTHESIOLOGY

## 2024-04-17 PROCEDURE — 85025 COMPLETE CBC W/AUTO DIFF WBC: CPT

## 2024-04-17 PROCEDURE — 86780 TREPONEMA PALLIDUM: CPT

## 2024-04-17 PROCEDURE — 86901 BLOOD TYPING SEROLOGIC RH(D): CPT

## 2024-04-17 PROCEDURE — 86900 BLOOD TYPING SEROLOGIC ABO: CPT

## 2024-04-17 PROCEDURE — 2580000003 HC RX 258: Performed by: OBSTETRICS & GYNECOLOGY

## 2024-04-17 PROCEDURE — 59025 FETAL NON-STRESS TEST: CPT

## 2024-04-17 PROCEDURE — 6370000000 HC RX 637 (ALT 250 FOR IP): Performed by: OBSTETRICS & GYNECOLOGY

## 2024-04-17 PROCEDURE — 6360000002 HC RX W HCPCS: Performed by: OBSTETRICS & GYNECOLOGY

## 2024-04-17 RX ORDER — SODIUM CHLORIDE, SODIUM LACTATE, POTASSIUM CHLORIDE, AND CALCIUM CHLORIDE .6; .31; .03; .02 G/100ML; G/100ML; G/100ML; G/100ML
1000 INJECTION, SOLUTION INTRAVENOUS PRN
Status: DISCONTINUED | OUTPATIENT
Start: 2024-04-17 | End: 2024-04-18 | Stop reason: HOSPADM

## 2024-04-17 RX ORDER — SODIUM CHLORIDE 9 MG/ML
25 INJECTION, SOLUTION INTRAVENOUS PRN
Status: DISCONTINUED | OUTPATIENT
Start: 2024-04-17 | End: 2024-04-17 | Stop reason: SDUPTHER

## 2024-04-17 RX ORDER — SODIUM CHLORIDE 0.9 % (FLUSH) 0.9 %
5-40 SYRINGE (ML) INJECTION PRN
Status: DISCONTINUED | OUTPATIENT
Start: 2024-04-17 | End: 2024-04-18 | Stop reason: HOSPADM

## 2024-04-17 RX ORDER — SODIUM CHLORIDE 0.9 % (FLUSH) 0.9 %
5-40 SYRINGE (ML) INJECTION EVERY 12 HOURS SCHEDULED
Status: DISCONTINUED | OUTPATIENT
Start: 2024-04-17 | End: 2024-04-18 | Stop reason: HOSPADM

## 2024-04-17 RX ORDER — CARBOPROST TROMETHAMINE 250 UG/ML
250 INJECTION, SOLUTION INTRAMUSCULAR PRN
Status: DISCONTINUED | OUTPATIENT
Start: 2024-04-17 | End: 2024-04-18 | Stop reason: HOSPADM

## 2024-04-17 RX ORDER — ACETAMINOPHEN 500 MG
1000 TABLET ORAL EVERY 8 HOURS SCHEDULED
Status: DISCONTINUED | OUTPATIENT
Start: 2024-04-17 | End: 2024-04-18 | Stop reason: HOSPADM

## 2024-04-17 RX ORDER — SODIUM CHLORIDE 9 MG/ML
INJECTION, SOLUTION INTRAVENOUS PRN
Status: DISCONTINUED | OUTPATIENT
Start: 2024-04-17 | End: 2024-04-18 | Stop reason: HOSPADM

## 2024-04-17 RX ORDER — BUPIVACAINE HYDROCHLORIDE 2.5 MG/ML
INJECTION, SOLUTION EPIDURAL; INFILTRATION; INTRACAUDAL PRN
Status: DISCONTINUED | OUTPATIENT
Start: 2024-04-17 | End: 2024-04-17 | Stop reason: SDUPTHER

## 2024-04-17 RX ORDER — DOCUSATE SODIUM 100 MG/1
100 CAPSULE, LIQUID FILLED ORAL 2 TIMES DAILY
Status: DISCONTINUED | OUTPATIENT
Start: 2024-04-17 | End: 2024-04-17 | Stop reason: SDUPTHER

## 2024-04-17 RX ORDER — NALOXONE HYDROCHLORIDE 0.4 MG/ML
INJECTION, SOLUTION INTRAMUSCULAR; INTRAVENOUS; SUBCUTANEOUS PRN
Status: DISCONTINUED | OUTPATIENT
Start: 2024-04-17 | End: 2024-04-17 | Stop reason: HOSPADM

## 2024-04-17 RX ORDER — DOCUSATE SODIUM 100 MG/1
100 CAPSULE, LIQUID FILLED ORAL 2 TIMES DAILY
Status: DISCONTINUED | OUTPATIENT
Start: 2024-04-17 | End: 2024-04-18 | Stop reason: HOSPADM

## 2024-04-17 RX ORDER — TRANEXAMIC ACID 10 MG/ML
1000 INJECTION, SOLUTION INTRAVENOUS
Status: ACTIVE | OUTPATIENT
Start: 2024-04-17 | End: 2024-04-18

## 2024-04-17 RX ORDER — SODIUM CHLORIDE, SODIUM LACTATE, POTASSIUM CHLORIDE, AND CALCIUM CHLORIDE .6; .31; .03; .02 G/100ML; G/100ML; G/100ML; G/100ML
500 INJECTION, SOLUTION INTRAVENOUS PRN
Status: DISCONTINUED | OUTPATIENT
Start: 2024-04-17 | End: 2024-04-18 | Stop reason: HOSPADM

## 2024-04-17 RX ORDER — LANOLIN 100 %
OINTMENT (GRAM) TOPICAL PRN
Status: DISCONTINUED | OUTPATIENT
Start: 2024-04-17 | End: 2024-04-18 | Stop reason: HOSPADM

## 2024-04-17 RX ORDER — ONDANSETRON 2 MG/ML
4 INJECTION INTRAMUSCULAR; INTRAVENOUS EVERY 6 HOURS PRN
Status: DISCONTINUED | OUTPATIENT
Start: 2024-04-17 | End: 2024-04-17 | Stop reason: SDUPTHER

## 2024-04-17 RX ORDER — ONDANSETRON 2 MG/ML
4 INJECTION INTRAMUSCULAR; INTRAVENOUS EVERY 6 HOURS PRN
Status: DISCONTINUED | OUTPATIENT
Start: 2024-04-17 | End: 2024-04-17 | Stop reason: HOSPADM

## 2024-04-17 RX ORDER — SODIUM CHLORIDE, SODIUM LACTATE, POTASSIUM CHLORIDE, CALCIUM CHLORIDE 600; 310; 30; 20 MG/100ML; MG/100ML; MG/100ML; MG/100ML
INJECTION, SOLUTION INTRAVENOUS CONTINUOUS
Status: DISCONTINUED | OUTPATIENT
Start: 2024-04-17 | End: 2024-04-18 | Stop reason: HOSPADM

## 2024-04-17 RX ORDER — MISOPROSTOL 200 UG/1
400 TABLET ORAL PRN
Status: DISCONTINUED | OUTPATIENT
Start: 2024-04-17 | End: 2024-04-18 | Stop reason: HOSPADM

## 2024-04-17 RX ORDER — IBUPROFEN 800 MG/1
800 TABLET ORAL EVERY 8 HOURS SCHEDULED
Status: DISCONTINUED | OUTPATIENT
Start: 2024-04-17 | End: 2024-04-18 | Stop reason: HOSPADM

## 2024-04-17 RX ORDER — METHYLERGONOVINE MALEATE 0.2 MG/ML
200 INJECTION INTRAVENOUS PRN
Status: DISCONTINUED | OUTPATIENT
Start: 2024-04-17 | End: 2024-04-18 | Stop reason: HOSPADM

## 2024-04-17 RX ORDER — ONDANSETRON 2 MG/ML
4 INJECTION INTRAMUSCULAR; INTRAVENOUS EVERY 6 HOURS PRN
Status: DISCONTINUED | OUTPATIENT
Start: 2024-04-17 | End: 2024-04-18 | Stop reason: HOSPADM

## 2024-04-17 RX ORDER — OXYCODONE HYDROCHLORIDE 10 MG/1
10 TABLET ORAL EVERY 4 HOURS PRN
Status: DISCONTINUED | OUTPATIENT
Start: 2024-04-17 | End: 2024-04-18 | Stop reason: HOSPADM

## 2024-04-17 RX ORDER — NALBUPHINE HYDROCHLORIDE 10 MG/ML
5 INJECTION, SOLUTION INTRAMUSCULAR; INTRAVENOUS; SUBCUTANEOUS EVERY 4 HOURS PRN
Status: DISCONTINUED | OUTPATIENT
Start: 2024-04-17 | End: 2024-04-17 | Stop reason: HOSPADM

## 2024-04-17 RX ORDER — TERBUTALINE SULFATE 1 MG/ML
0.25 INJECTION, SOLUTION SUBCUTANEOUS
Status: ACTIVE | OUTPATIENT
Start: 2024-04-17 | End: 2024-04-18

## 2024-04-17 RX ORDER — ONDANSETRON 4 MG/1
4 TABLET, ORALLY DISINTEGRATING ORAL EVERY 6 HOURS PRN
Status: DISCONTINUED | OUTPATIENT
Start: 2024-04-17 | End: 2024-04-18 | Stop reason: HOSPADM

## 2024-04-17 RX ORDER — OXYCODONE HYDROCHLORIDE 5 MG/1
5 TABLET ORAL EVERY 4 HOURS PRN
Status: DISCONTINUED | OUTPATIENT
Start: 2024-04-17 | End: 2024-04-18 | Stop reason: HOSPADM

## 2024-04-17 RX ADMIN — BUPIVACAINE HYDROCHLORIDE 5 ML: 2.5 INJECTION, SOLUTION EPIDURAL; INFILTRATION; INTRACAUDAL at 07:16

## 2024-04-17 RX ADMIN — Medication 1 MILLI-UNITS/MIN: at 06:42

## 2024-04-17 RX ADMIN — Medication 166.7 ML: at 08:38

## 2024-04-17 RX ADMIN — ACETAMINOPHEN 1000 MG: 500 TABLET ORAL at 21:05

## 2024-04-17 RX ADMIN — DOCUSATE SODIUM 100 MG: 100 CAPSULE, LIQUID FILLED ORAL at 21:05

## 2024-04-17 RX ADMIN — SODIUM CHLORIDE, POTASSIUM CHLORIDE, SODIUM LACTATE AND CALCIUM CHLORIDE 1000 ML: 600; 310; 30; 20 INJECTION, SOLUTION INTRAVENOUS at 00:45

## 2024-04-17 RX ADMIN — IBUPROFEN 800 MG: 800 TABLET, FILM COATED ORAL at 13:49

## 2024-04-17 RX ADMIN — SODIUM CHLORIDE, POTASSIUM CHLORIDE, SODIUM LACTATE AND CALCIUM CHLORIDE: 600; 310; 30; 20 INJECTION, SOLUTION INTRAVENOUS at 01:39

## 2024-04-17 RX ADMIN — Medication 909.1 MILLI-UNITS/MIN: at 08:38

## 2024-04-17 RX ADMIN — Medication 1 MILLI-UNITS/MIN: at 06:27

## 2024-04-17 RX ADMIN — SODIUM CHLORIDE, POTASSIUM CHLORIDE, SODIUM LACTATE AND CALCIUM CHLORIDE: 600; 310; 30; 20 INJECTION, SOLUTION INTRAVENOUS at 10:58

## 2024-04-17 ASSESSMENT — PAIN DESCRIPTION - LOCATION: LOCATION: BACK

## 2024-04-17 ASSESSMENT — PAIN SCALES - GENERAL: PAINLEVEL_OUTOF10: 2

## 2024-04-17 ASSESSMENT — PAIN DESCRIPTION - DESCRIPTORS: DESCRIPTORS: DISCOMFORT

## 2024-04-17 ASSESSMENT — PAIN - FUNCTIONAL ASSESSMENT: PAIN_FUNCTIONAL_ASSESSMENT: ACTIVITIES ARE NOT PREVENTED

## 2024-04-17 NOTE — PLAN OF CARE
Problem: Pain  Goal: Verbalizes/displays adequate comfort level or baseline comfort level  Outcome: Progressing     Problem: Postpartum  Goal: Experiences normal postpartum course  Description:  Postpartum OB-Pregnancy care plan goal which identifies if the mother is experiencing a normal postpartum course  Outcome: Progressing  Goal: Appropriate maternal -  bonding  Description:  Postpartum OB-Pregnancy care plan goal which identifies if the mother and  are bonding appropriately  Outcome: Progressing  Goal: Establishment of infant feeding pattern  Description:  Postpartum OB-Pregnancy care plan goal which identifies if the mother is establishing a feeding pattern with their   Outcome: Progressing  Goal: Incisions, wounds, or drain sites healing without S/S of infection  Outcome: Progressing  Flowsheets (Taken 2024)  Incisions, Wounds, or Drain Sites Healing Without Sign and Symptoms of Infection:   ADMISSION and DAILY: Assess and document risk factors for pressure ulcer development   TWICE DAILY: Assess and document skin integrity   Implement wound care per orders   TWICE DAILY: Assess and document dressing/incision, wound bed, drain sites and surrounding tissue     Problem: Infection - Adult  Goal: Absence of infection at discharge  Outcome: Progressing  Flowsheets (Taken 2024)  Absence of infection at discharge:   Assess and monitor for signs and symptoms of infection   Monitor lab/diagnostic results   Monitor endotracheal (as able) and nasal secretions for changes in amount and color   Monitor all insertion sites i.e., indwelling lines, tubes and drains  Goal: Absence of infection during hospitalization  Outcome: Progressing  Flowsheets (Taken 2024)  Absence of infection during hospitalization: Assess and monitor for signs and symptoms of infection  Goal: Absence of fever/infection during anticipated neutropenic period  Outcome: Progressing  Flowsheets (Taken

## 2024-04-17 NOTE — ANESTHESIA PROCEDURE NOTES
Epidural Block    Patient location during procedure: OB  Start time: 4/17/2024 1:10 AM  End time: 4/17/2024 1:22 AM  Reason for block: labor epidural  Staffing  Performed by: Carlos Alberto Verde APRN - CRNA  Authorized by: Wilbert German MD    Epidural  Patient position: sitting  Prep: Betadine and site prepped and draped  Patient monitoring: continuous pulse ox and frequent blood pressure checks  Approach: midline  Location: L3-4  Injection technique: GABRIELLE saline  Guidance: paresthesia technique  Provider prep: mask and sterile gloves  Needle  Needle type: Tuohy   Needle gauge: 17 G  Needle length: 3.5 in  Needle insertion depth: 7 cm  Catheter type: side hole  Catheter size: 20 G  Catheter at skin depth: 12 cm  Test dose: negativeCatheter Secured: tegaderm  Assessment  Sensory level: T6  Hemodynamics: stable  Attempts: 1  Outcomes: patient tolerated procedure well  Additional Notes  Sterile tech., sitting position, Lidocaine skin wheel, secured with steri-strips, tegaderm dressing, dosed & infusion with Marcaine 0.125% with Fentanyl 3 mcg/ml, infusion rate at 3.3 ml every 20 minutes.  Preanesthetic Checklist  Completed: patient identified, IV checked, site marked, risks and benefits discussed, surgical/procedural consents, equipment checked, pre-op evaluation, timeout performed, anesthesia consent given, oxygen available, monitors applied/VS acknowledged, fire risk safety assessment completed and verbalized and blood product R/B/A discussed and consented

## 2024-04-17 NOTE — FLOWSHEET NOTE
Pt to restroom and was unable able to void at that time. Pt educated on proper perineal comfort and care. Maribell care performed and gown and maribell pads changed. Pt transferred with all belongings, FOB, and infant to postpartum room 2261. Pt oriented to room, call light, and plan of care. All questions answered at this time, Pt denies any additional needs.

## 2024-04-17 NOTE — ANESTHESIA POSTPROCEDURE EVALUATION
Department of Anesthesiology  Postprocedure Note    Patient: Mary Blake  MRN: [id number]  YOB: 2000  Date of evaluation: 4/17/2024    Procedure Summary       Date: 04/17/24 Room / Location:     Anesthesia Start: 0100 Anesthesia Stop: 0832    Procedure: Labor Analgesia Diagnosis:     Scheduled Providers:  Responsible Provider: Javon Petersen MD    Anesthesia Type: epidural ASA Status: 2            Anesthesia Type: Epidural    Maria Elena Phase I: Maria Elena Score: 9    Maria Elena Phase II: Maria Elena Score: 10    Anesthesia Post Evaluation    Patient location during evaluation: bedside  Patient participation: complete - patient participated  Level of consciousness: awake and alert  Pain score: 1  Airway patency: patent  Nausea & Vomiting: no nausea and no vomiting  Cardiovascular status: hemodynamically stable  Respiratory status: room air  Hydration status: stable  Pain management: adequate and satisfactory to patient    /69   Pulse 73   Temp 36.8 °C (98.3 °F) (Oral)   Resp 16   Ht 1.575 m (5' 2\")   Wt 87.1 kg (192 lb)   SpO2 97%   Breastfeeding Unknown   BMI 35.12 kg/m²         No notable events documented.

## 2024-04-17 NOTE — FLOWSHEET NOTE
04/17/24 0630   Fetal Heart Rate   Mode External US   Baseline Rate 115 bpm   Baseline Classification Normal   Variability 6-25 BPM   Pattern Accelerations   Patient Feels Fetal Movement Yes   Interventions RN at Bedside;Ultrasound Adjusted   OB Bladder Status Non-distended   Fetal Monitoring Strip   FMS Reviewed? Yes   FMS Reviewed By? cs   Uterine Activity   UA Mode Swan;Palpation   Contraction Frequency 2.5-7.5   Contraction Duration    Contraction Intensity Moderate   Resting Tone Palpated Soft

## 2024-04-17 NOTE — FLOWSHEET NOTE
Dr. Jackson notified and updated on pt status and history.  Order received for pt to be admitted for labor.  Pt can receive epidural when ready.

## 2024-04-17 NOTE — FLOWSHEET NOTE
Recovery end at 1050. No complications noted. Bleeding WNL, fundus firm and midline, and all VSS. Pt was not able ambulate to bathroom at this time as she states her \"legs feel heavy and weak\". Will check back at 1130 to see if pt is ready to get up.

## 2024-04-17 NOTE — FLOWSHEET NOTE
Rn to pt beside to perform a position change due to repeat variable decelerations on the fetal tracing. When checking the pad, rn sees that infant is crowing.     Md immediately notified as well as charge and triage.     This RN getting pt in position for pushing and infant head has already been delivered. RICHARD Liang, RN reduces lose nuchal cord and delivers rest of infant body at 0833. Infant immediately placed on mothers abdomen and stimulated, dried, and bulb suctioned.     MD to bedside at 0833 and putting on gown to assess pt and deliver placenta.

## 2024-04-17 NOTE — FLOWSHEET NOTE
Pt transferred and admitted for labor in 2287.  IV infusing.  Consents signed.  Pt orientated to room and call light.  Requesting epidural at this time.

## 2024-04-17 NOTE — H&P
Department of Obstetrics and Gynecology   Obstetrics History and Physical        CHIEF COMPLAINT:  contractions    HISTORY OF PRESENT ILLNESS:    Mary Blake  is a 23 y.o.  female at 39w5d presents with a chief complaint as above and is being admitted for latent labor    Estimated Due Date: Estimated Date of Delivery: 24    PRENATAL CARE: Complicated by: GBS pos     PAST OB HISTORY:  OB History          2    Para   1    Term   1            AB        Living   1         SAB        IAB        Ectopic        Molar        Multiple   0    Live Births   1              Past Medical History:    History reviewed. No pertinent past medical history.  Past Surgical History:        Procedure Laterality Date    EYE SURGERY       Allergies:  Patient has no known allergies.  Social History:    Social History     Socioeconomic History    Marital status: Single     Spouse name: Not on file    Number of children: Not on file    Years of education: Not on file    Highest education level: Not on file   Occupational History    Not on file   Tobacco Use    Smoking status: Never    Smokeless tobacco: Never   Vaping Use    Vaping Use: Former   Substance and Sexual Activity    Alcohol use: Never    Drug use: Never    Sexual activity: Yes     Partners: Male   Other Topics Concern    Not on file   Social History Narrative    Not on file     Social Determinants of Health     Financial Resource Strain: Low Risk  (2022)    Overall Financial Resource Strain (CARDIA)     Difficulty of Paying Living Expenses: Not hard at all   Food Insecurity: No Food Insecurity (3/27/2024)    Hunger Vital Sign     Worried About Running Out of Food in the Last Year: Never true     Ran Out of Food in the Last Year: Never true   Transportation Needs: No Transportation Needs (3/27/2024)    PRAPARE - Transportation     Lack of Transportation (Medical): No     Lack of Transportation (Non-Medical): No   Physical Activity: Insufficiently    SpO2: 94% 98%  100%   Weight:       Height:         General appearance:  awake, alert, cooperative, no apparent distress, and appears stated age  Neurologic:  Awake, alert, oriented to name, place and time.    Lungs:  No increased work of breathing, good air exchange  Abdomen:  Soft, non tender, gravid, fundal height consistent with the gestational age, EFW by Leopold's maneuvers is 3400 grams  Pelvis:  Adequate pelvis  Cervix: 4-5/80/-1, ceph. AROM, light mec   Contraction frequency: every 6 minutes  Membranes:  ruptured  Labs: CBC:   Lab Results   Component Value Date/Time    WBC 12.4 2024 12:40 AM    RBC 4.10 2024 12:40 AM    HGB 11.6 2024 12:40 AM    HCT 34.3 2024 12:40 AM    MCV 83.7 2024 12:40 AM    MCH 28.2 2024 12:40 AM    MCHC 33.7 2024 12:40 AM    RDW 14.2 2024 12:40 AM     2024 12:40 AM    MPV 7.4 2024 12:40 AM       ASSESSMENT: 22 yo  @ 39 5/7 w   Spontaneous labor  Labor protraction, s/p amniotomy, start pit augmentation     PLAN: Admit  Labor: Routine labor orders  Fetus: Reassuring  GBS: Positive, PCN    I have presented reasonable alternatives to the patient's proposed care, treatment, and services. The discussion I have done encompassed risks, benefits, and side effects related to the alternatives and the risks related to not receiving the proposed care, treatment, and services.     All questions answered. Patient wishes to proceed. The surgical site was confirmed by the patient and me.      Electronically signed by Magi Jackson MD on 2024 at 7:01 AM

## 2024-04-17 NOTE — CARE COORDINATION
SW consult noted. We will likely see patient after delivery. We are following along. If there are interim needs we can address today please call.     Respectfully submitted,    Rachelle CHATMAN, QAMAR  [facility]   [phone]    Electronically signed by COMPA Jaramillo, AMANUEL on 4/17/2024 at 9:19 AM

## 2024-04-17 NOTE — FLOWSHEET NOTE
Pt presents to Triage A.  Pt complains of pain rating 7 out of 10.  Pt reports fetal movement.  Denies any bleeding.  Pt placed on central banking EFM and toco monitor.  Pt states contractions started around 2100.  Pt controlled through contractions.  Contraction palpate mild and soft between contraction.  SVE 4/80/-2.

## 2024-04-17 NOTE — L&D DELIVERY SUMMARY NOTE
[facility]          [address] [place] [address]                         LABOR AND DELIVERY NOTE      PATIENT NAME: OSMAN SOSA                : 2000  MED REC NO: [id number]                      ROOM: [address] [place] [address]  ACCOUNT NO: [id number]                       ADMIT DATE: 2024  PROVIDER: Sarah Flesch Sabin, MD      DATE OF PROCEDURE:  2024    DELIVERY NOTE:  The patient is a G2, P1, at 39 weeks, who presented with premature rupture of membranes.  The patient received antibiotics for group beta strep positive as well as Pitocin for augmentation.  The patient progressed to complete and delivered spontaneously in the bed without pushing efforts.  The baby was a baby girl, Apgars were 9 and 9.  The placenta delivered spontaneously, and manual exploration was performed.  There were no lacerations.  The bladder was catheterized for 100 mL.  The quantitative blood loss was 25 mL.  A vaginal sweep ensured no retained sponges, and counts were correct.          SARAH FLESCH SABIN, MD      D:  2024 09:25:20     T:  2024 15:31:39     SFS/AQS  Job #:  [id number]     Doc#:  [id number]

## 2024-04-17 NOTE — ANESTHESIA PRE PROCEDURE
Department of Anesthesiology  Preprocedure Note       Name:  Mary Blake   Age:  23 y.o.  :  2000                                          MRN:  [id number]         Date:  2024      Surgeon: Dr. Jackson    Procedure: RAE for L&D    Medications prior to admission:   Prior to Admission medications    Medication Sig Start Date End Date Taking? Authorizing Provider   Prenatal MV-Min-Fe Fum-FA-DHA (PRENATAL 1 PO) Take 1 tablet by mouth daily    ProviderLisandra MD   Cholecalciferol 50 MCG (2000) TABS Take 2,000 Int'l Units by mouth daily    Provider, MD Lisandra       Current medications:    Current Facility-Administered Medications   Medication Dose Route Frequency Provider Last Rate Last Admin    lactated ringers IV soln infusion   IntraVENous Continuous Mary Lou Jackson  mL/hr at 24 0139 New Bag at 24 0139    lactated ringers bolus 500 mL  500 mL IntraVENous PRN Mary Lou Jackson MD        Or    lactated ringers bolus 1,000 mL  1,000 mL IntraVENous PRN Mary Lou Jackson  mL/hr at 24 0045 1,000 mL at 24 0045    sodium chloride flush 0.9 % injection 5-40 mL  5-40 mL IntraVENous 2 times per day Mary Lou Jackson MD        sodium chloride flush 0.9 % injection 5-40 mL  5-40 mL IntraVENous PRN Mary Lou Jackson MD        0.9 % sodium chloride infusion  25 mL IntraVENous PRN Mary Lou Jackson MD        methylergonovine (METHERGINE) injection 200 mcg  200 mcg IntraMUSCular PRN Mary Lou Jackson MD        carboprost (HEMABATE) injection 250 mcg  250 mcg IntraMUSCular PRN Mary Lou Jackson MD        miSOPROStol (CYTOTEC) tablet 400 mcg  400 mcg Buccal PRN Mary Lou Jackson MD        tranexamic acid-NaCl IVPB premix 1,000 mg  1,000 mg IntraVENous Once PRN Mary Lou Jackson MD        oxytocin (PITOCIN) 30 units in 500 mL infusion  87.3 johann-units/min IntraVENous Continuous PRN Mary Lou Jackson MD        And    oxytocin (PITOCIN) 10 unit bolus

## 2024-04-17 NOTE — FLOWSHEET NOTE
Dr. Jackson updated on pt status.  Order received to start pitocin at this time.  Dr. Renetta oliver for AROM of fluids.

## 2024-04-18 VITALS
SYSTOLIC BLOOD PRESSURE: 121 MMHG | OXYGEN SATURATION: 98 % | HEART RATE: 79 BPM | TEMPERATURE: 97.8 F | BODY MASS INDEX: 35.33 KG/M2 | WEIGHT: 192 LBS | DIASTOLIC BLOOD PRESSURE: 83 MMHG | HEIGHT: 62 IN | RESPIRATION RATE: 16 BRPM

## 2024-04-18 PROCEDURE — 6370000000 HC RX 637 (ALT 250 FOR IP): Performed by: OBSTETRICS & GYNECOLOGY

## 2024-04-18 PROCEDURE — 90707 MMR VACCINE SC: CPT | Performed by: OBSTETRICS & GYNECOLOGY

## 2024-04-18 PROCEDURE — 90471 IMMUNIZATION ADMIN: CPT | Performed by: OBSTETRICS & GYNECOLOGY

## 2024-04-18 PROCEDURE — 6360000002 HC RX W HCPCS: Performed by: OBSTETRICS & GYNECOLOGY

## 2024-04-18 RX ORDER — IBUPROFEN 800 MG/1
800 TABLET ORAL EVERY 8 HOURS SCHEDULED
Qty: 120 TABLET | Refills: 3 | Status: SHIPPED | OUTPATIENT
Start: 2024-04-18

## 2024-04-18 RX ADMIN — IBUPROFEN 800 MG: 800 TABLET, FILM COATED ORAL at 08:39

## 2024-04-18 RX ADMIN — IBUPROFEN 800 MG: 800 TABLET, FILM COATED ORAL at 01:06

## 2024-04-18 RX ADMIN — MEASLES, MUMPS, AND RUBELLA VIRUS VACCINE LIVE 0.5 ML: 1000; 12500; 1000 INJECTION, POWDER, LYOPHILIZED, FOR SUSPENSION SUBCUTANEOUS at 14:51

## 2024-04-18 RX ADMIN — ACETAMINOPHEN 1000 MG: 500 TABLET ORAL at 05:04

## 2024-04-18 RX ADMIN — DOCUSATE SODIUM 100 MG: 100 CAPSULE, LIQUID FILLED ORAL at 08:39

## 2024-04-18 ASSESSMENT — PAIN DESCRIPTION - DESCRIPTORS
DESCRIPTORS: CRAMPING
DESCRIPTORS: CRAMPING

## 2024-04-18 ASSESSMENT — PAIN DESCRIPTION - LOCATION
LOCATION: ABDOMEN
LOCATION: ABDOMEN

## 2024-04-18 ASSESSMENT — PAIN SCALES - GENERAL
PAINLEVEL_OUTOF10: 3
PAINLEVEL_OUTOF10: 2
PAINLEVEL_OUTOF10: 3

## 2024-04-18 ASSESSMENT — PAIN DESCRIPTION - ORIENTATION: ORIENTATION: LOWER

## 2024-04-18 NOTE — DISCHARGE SUMMARY
Department of Obstetrics and Gynecology  Postpartum Discharge Summary      Admit Date: 2024    Admit Diagnosis: Normal labor [O80, Z37.9]    Discharge Date: 24.  Any delay in discharge from ordered D/C date due to  factors.    Discharge Diagnoses: spontaneous vaginal delivery       Medication List        START taking these medications      ibuprofen 800 MG tablet  Commonly known as: ADVIL;MOTRIN  Take 1 tablet by mouth every 8 hours            CONTINUE taking these medications      Cholecalciferol 50 MCG ( UT) Tabs     PRENATAL 1 PO               Where to Get Your Medications        These medications were sent to [facility] [id number] [facility] [address] - P [phone] - F [phone] [address] [facility] [facility] [place] [address]      Phone: [phone]   ibuprofen 800 MG tablet         Service: Obstetrics    Consults: none    Significant Diagnostic Studies: none    Postpartum complications: none     Condition at Discharge: good    Hospital Course: uncomplicated    Discharge Instructions:     Activity: as tolerated    Diet: regular diet    Instructions: No intercourse and nothing in the vagina for 6 weeks. Do not drive while using pain medications. Keep any wounds clean and dry    Discharge to: Home    Disposition / Follow up: Return to office in 6 weeks    Home Health Nurse visit within 24-48 h if qualifies     Data:  Apgars:  Information for the patient's :  Radha Blake [[id number]]   APGAR One: 9   Information for the patient's :  Radha Blake [[id number]]   APGAR Five: 9   Birth Weight:  Information for the patient's :  Radha Blake [[id number]]   Birth Weight: 3.55 kg (7 lb 13.2 oz)   Home with mother    Electronically signed by Em Garcia MD on 2024 at 3:52 PM

## 2024-04-18 NOTE — PROGRESS NOTES
[facility] Department of Anesthesiology  Post-Anesthesia Note       Name:  Mary Blake                                         Age:  23 y.o.  MRN:  [id number]     Last Vitals & Oxygen Saturation: /78   Pulse 76   Temp 36.6 °C (97.9 °F) (Oral)   Resp 16   Ht 1.575 m (5' 2\")   Wt 87.1 kg (192 lb)   SpO2 98%   Breastfeeding Unknown   BMI 35.12 kg/m²   No data found.    Level of consciousness: awake, alert, and oriented    Respiratory: stable     Cardiovascular: stable     Hydration: stable     PONV: stable     Post-op pain: adequate analgesia    Post-op assessment: no apparent anesthetic complications and tolerated procedure well    Complications:  none    BILL Capps CRNA  April 18, 2024   6:57 AM

## 2024-04-18 NOTE — PLAN OF CARE
Problem: Pain  Goal: Verbalizes/displays adequate comfort level or baseline comfort level  2024 1037 by Deyanira Burch RN  Outcome: Progressing  2024 0534 by Susie Duran RN  Outcome: Progressing     Problem: Postpartum  Goal: Experiences normal postpartum course  Description:  Postpartum OB-Pregnancy care plan goal which identifies if the mother is experiencing a normal postpartum course  2024 1037 by Deyanira Burch RN  Outcome: Progressing  2024 0534 by Susie Duran RN  Outcome: Progressing  Goal: Appropriate maternal -  bonding  Description:  Postpartum OB-Pregnancy care plan goal which identifies if the mother and  are bonding appropriately  2024 1037 by Deyanira Burch RN  Outcome: Progressing  2024 0534 by Susie Duran RN  Outcome: Progressing  Goal: Establishment of infant feeding pattern  Description:  Postpartum OB-Pregnancy care plan goal which identifies if the mother is establishing a feeding pattern with their   2024 1037 by Deyanira Burch RN  Outcome: Progressing  2024 0534 by Susie Duran RN  Outcome: Progressing  Goal: Incisions, wounds, or drain sites healing without S/S of infection  2024 0534 by Susie Duran RN  Outcome: Progressing  Flowsheets (Taken 2024 2330)  Incisions, Wounds, or Drain Sites Healing Without Sign and Symptoms of Infection:   ADMISSION and DAILY: Assess and document risk factors for pressure ulcer development   TWICE DAILY: Assess and document skin integrity   TWICE DAILY: Assess and document dressing/incision, wound bed, drain sites and surrounding tissue     Problem: Infection - Adult  Goal: Absence of infection at discharge  2024 1037 by Deyanira Burch RN  Outcome: Progressing  2024 0534 by Susie Duran RN  Outcome: Progressing  Flowsheets (Taken 2024 2330)  Absence of infection at discharge:   Assess and monitor for signs and symptoms of infection    Collaborate with multidisciplinary team to address chronic and comorbid conditions and prevent exacerbation or deterioration

## 2024-04-18 NOTE — DISCHARGE INSTRUCTIONS
baby.      BLEEDING  Vaginal bleeding will decrease in amount over the next few weeks.  You will notice that as your activity increases, you flow may increase.  This is your body's way of telling you to \"take it easy\" and rest.  Call your OB if you are saturating more than one maxi pad in an hour for 2 hours and resting does not help. Also call if your bleeding has a foul odor or you are passing clots larger than a lemon on several occasions.     BREAST CARE  Take pain medications as needed and as prescribed by your OB provider for pain.  If you develop a warm, red, tender area on your breast or develop a fever contact your OB provider.    For breastfeeding mothers:  If you become engorged, feeding may be more difficult or painful for 1-2 days.  You may find it helpful to hand express some milk.  Hand expressing may make it easier for your baby to latch.  While breastfeeding, continue to take your prenatal vitamins as directed by your doctor or midwife.  Refer to the breastfeeding booklet in the  folder/binder for more information.  For any questions or concerns please contact a Lactation Consultant.  Leave a message and your call will be returned.    For NON-breastfeeding mothers:  You may apply ice packs to your breasts over your bra for twenty minutes at a time for comfort.  Avoid stimulation to your breast.  When showering allow the water to strike your back not your breasts.  Wear a good fitting bra until your milk has come and gone.  A sports bra is a good idea.    INCISIONAL CARE/ EPISIOTOMY CARE  Incisional care:  Clean your incision in the shower with mild soap and water.  After showering pat the incision area dry and allow the area to remain open to air.  If used, steri-strips should be removed by 2 weeks.  If used, staples should be removed by the home health nurse or OB office by 1 week.  An abdominal binder may provide support for your incision.    Episiotomy (maribell-care):  Use the maribell-bottle

## 2024-04-18 NOTE — FLOWSHEET NOTE
Patient informed that she is rubella non-immune and offered the MMR vaccine. Vaccine information sheet given and consent signed.

## 2024-04-18 NOTE — LACTATION NOTE
This note was copied from a baby's chart.  Lactation Consult Note    Data: Consult received and appreciated. LC reviewed chart and spoke with bedside RN.    Maternal History: denies    OB/Delivery Risks for Lactation: precipitous delivery     Breast pump for home use: Evan    Action: LC to room. Introduced self and lactation services. Mother agreeable to consult at this time. Mother resting in bed, infant stirring in crib, waking for feeding. Mother about to attempt to feed her and agreeable to LC assist at this time.  Mother unswaddled infant and placed her in cradle hold on left breast, using boppy pillow for support. Infant latched immediately and maintained SRS with audible swallows.  Discussed recognizing hunger cues and offering the breast when cues are shown. Encouraged breastfeeding on demand and attempting/offering at least every 3 hours. Encouraged unlimited skin to skin contact with infant and reviewed benefits including better temperature, heart rate, respiration, blood pressure, and blood sugar regulation. Also increased bonding and milk supply associated with skin to skin contact.   Reviewed milk supply/production process and when to expect milk volumes to increase and milk to transition in.   Reviewed feeding positions and latch on techniques. Encouraged lining nose to nipple, holding belly to belly and waiting for wide open mouth before quickly bringing infant onto breast. Reviewed importance of obtaining a deep latch to ensure milk transfer, establish milk production, maintain milk supply and improve maternal comfort.  Reviewed  signs of milk transfer, output goals and normal feeding/sleeping behaviors for the first 24 hours and beyond.    LC provided breastfeeding packet/handouts and encouraged mother to read for additional information about breastfeeding, including resources for after discharge.  LC answered all questions mother asked, supported her efforts and encouraged her to call as needed.

## 2024-04-18 NOTE — FLOWSHEET NOTE
Assessment completed and vitals obtained, findings WDL. Plan of care for the day discussed, patient verbilized understanding and had no further questions.

## 2024-04-18 NOTE — FLOWSHEET NOTE
Patient awake in bed. Call light with in reach. Denies needs at this time. RN to continue to monitor.

## 2024-04-18 NOTE — PLAN OF CARE
Problem: Pain  Goal: Verbalizes/displays adequate comfort level or baseline comfort level  Outcome: Progressing     Problem: Postpartum  Goal: Experiences normal postpartum course  Description:  Postpartum OB-Pregnancy care plan goal which identifies if the mother is experiencing a normal postpartum course  Outcome: Progressing  Goal: Appropriate maternal -  bonding  Description:  Postpartum OB-Pregnancy care plan goal which identifies if the mother and  are bonding appropriately  Outcome: Progressing  Goal: Establishment of infant feeding pattern  Description:  Postpartum OB-Pregnancy care plan goal which identifies if the mother is establishing a feeding pattern with their   Outcome: Progressing  Goal: Incisions, wounds, or drain sites healing without S/S of infection  Outcome: Progressing  Flowsheets (Taken 20240)  Incisions, Wounds, or Drain Sites Healing Without Sign and Symptoms of Infection:   ADMISSION and DAILY: Assess and document risk factors for pressure ulcer development   TWICE DAILY: Assess and document skin integrity   TWICE DAILY: Assess and document dressing/incision, wound bed, drain sites and surrounding tissue     Problem: Postpartum  Goal: Appropriate maternal -  bonding  Description:  Postpartum OB-Pregnancy care plan goal which identifies if the mother and  are bonding appropriately  Outcome: Progressing     Problem: Infection - Adult  Goal: Absence of infection at discharge  Outcome: Progressing  Flowsheets (Taken 20240)  Absence of infection at discharge:   Assess and monitor for signs and symptoms of infection   Monitor lab/diagnostic results   Monitor all insertion sites i.e., indwelling lines, tubes and drains  Goal: Absence of infection during hospitalization  Outcome: Progressing  Flowsheets (Taken 20240)  Absence of infection during hospitalization:   Assess and monitor for signs and symptoms of infection   Monitor

## 2024-04-18 NOTE — FLOWSHEET NOTE
Postpartum and infant care teaching completed and forms signed by patient. Copy witnessed by RN and given to patient. Patient verbalized understanding of all teaching points. Patient plans to follow-up with OB Provider as instructed. Patient verbalizes understanding of discharge instructions and denies further questions.  ID bands checked. Mother's ID band and one of baby's ID bands removed and taped to footprint sheet. Patient discharged in stable condition accompanied by family/guardian. Discharged in wheelchair, holding baby in arms.

## 2024-04-18 NOTE — CARE COORDINATION
Discharge order noted. SW called bedside nurse who advised that the patient would like to discharge this afternoon verus tomorrow.     SW advised that Amina Fernandez would be here shortly after 12pm and would call after she's had a chance to review everything.     Respectfully submitted,    Rachelle CHATMAN, QAMAR  [facility]   [phone]    Electronically signed by COMPA Jaramillo, AMANUEL on 4/18/2024 at 11:42 AM

## 2024-04-18 NOTE — CARE COORDINATION
Case Management Mom/Baby Assessment    Identifying Information    Mother of Baby: Mary Blake 2000 Mother's :    Father of Baby:Arturo Allen  Father's : 1999    Baby's Name: Doretha Allen  Delivery Date:24   Baby's Weight:   7lbs 13.2oz    Apgar:  9/9  Nursing concerns for baby:None  Week Gestation: 39 weeks  Prenatal Care:Herman  Baby's Urine or Cord Drug Screen Yes__x_  No___ Results: Pending  PCP for baby:Gabriela Khalil  Date of appt: 24     Time of Appt:  11:20am    Reasoning for Referral: Positive for THC on 23 and at Delivery      Assessment Information    Discharge Address:[address] [place] [address]  Phone:[phone]    Resides with:ROSARIO MADDOX,  2 yr old daughter and     Emergency Contact:Arturo Allen  Phone:[phone]    Support System:Both families, many siblings and Parents.    Other Children    Name:Luis Allen :2022    Custody:VARSHA has custody of her children    Father of Baby Involvement:resides with family.    Have you ever had contact with Children's Services (describe):NO    Supplies: Has all needed supplies  Car Seat  Diapers  Crib/Bassinet  Feeding  [place]        Resources:  [facility]-has  Medicaid- caresource  Food [place]  Help Me Grow/Every Child Succeeds- referral made  Transportation   Cash Assistance     Education some College  Occupation on Maternity leave -works at [employer].    History   Domestic Abuse:NO  Physical Abuse: NO  Sexual Abuse:NO  Drug Abuse/Prescription Medication:did use THC, doesn't plan to continue due to breast feeding.  Depression:Post Partum with 1st baby   Medication/Counseling:None  Does MOB have Medical Marijuana card? No  Any Guns or Animals in the home? No guns or weapons in house, no pets.        Summary:VARSHA reports using THC during her pregnancy, doesn't plan to use after delivery due to breast feeding.    Referrals:[facility] intake # [id number] and Every child succeeds.

## 2024-04-18 NOTE — PROGRESS NOTES
Department of Obstetrics and Gynecology  Vaginal Delivery Postpartum Rounds    SUBJECTIVE:  Pain is controlled with Tylenol or non-steroidal anti-inflammatory drugs. Her lochia is normal.    OBJECTIVE:  Vital Signs: /81   Pulse 76   Temp 97.6 °F (36.4 °C) (Oral)   Resp 18   Ht 1.575 m (5' 2\")   Wt 87.1 kg (192 lb)   SpO2 97%   Breastfeeding Unknown   BMI 35.12 kg/m²   Appearance/Psychiatric: awake, alert, cooperative, no apparent distress, appears stated age  Constitutional: The patient is well nourished.  Cardiovascular: She does not have edema.  Respiratory: Respiratory effort is normal.  Gastrointestinal: Soft, non tender, uterine fundus is firm below umbilicus  Extremities: nontender to palpation  Perineum: intact     LABS / IMAGING:    Lab Results   Component Value Date/Time    WBC 12.4 2024 12:40 AM    RBC 4.10 2024 12:40 AM    HGB 11.6 2024 12:40 AM    HCT 34.3 2024 12:40 AM    MCV 83.7 2024 12:40 AM    MCH 28.2 2024 12:40 AM    MCHC 33.7 2024 12:40 AM    RDW 14.2 2024 12:40 AM     2024 12:40 AM    MPV 7.4 2024 12:40 AM     ASSESSMENT:    Postpartum Day 1 s/p     PLAN:   1. Continue routine postpartum care   2. Female infant, breast feeding.  3. Discharge home on Postpartum Day 1  4. Return to office in 6 weeks   5. Postpartum instructions reviewed and all patient's Questions answered    Electronically signed by Em Garcia MD on 2024 at 10:35 AM